# Patient Record
Sex: MALE | Race: BLACK OR AFRICAN AMERICAN | Employment: FULL TIME | URBAN - METROPOLITAN AREA
[De-identification: names, ages, dates, MRNs, and addresses within clinical notes are randomized per-mention and may not be internally consistent; named-entity substitution may affect disease eponyms.]

---

## 2019-08-05 ENCOUNTER — OFFICE VISIT (OUTPATIENT)
Dept: FAMILY MEDICINE CLINIC | Age: 49
End: 2019-08-05
Payer: COMMERCIAL

## 2019-08-05 VITALS
DIASTOLIC BLOOD PRESSURE: 72 MMHG | RESPIRATION RATE: 16 BRPM | SYSTOLIC BLOOD PRESSURE: 114 MMHG | TEMPERATURE: 97.6 F | WEIGHT: 296.2 LBS | HEART RATE: 66 BPM | HEIGHT: 72 IN | BODY MASS INDEX: 40.12 KG/M2

## 2019-08-05 DIAGNOSIS — Z00.00 ENCOUNTER FOR MEDICAL EXAMINATION TO ESTABLISH CARE: Primary | ICD-10-CM

## 2019-08-05 DIAGNOSIS — Z13.220 LIPID SCREENING: ICD-10-CM

## 2019-08-05 DIAGNOSIS — K64.8 INTERNAL HEMORRHOID: ICD-10-CM

## 2019-08-05 DIAGNOSIS — K57.90 DIVERTICULOSIS: ICD-10-CM

## 2019-08-05 DIAGNOSIS — Z13.29 THYROID DISORDER SCREEN: ICD-10-CM

## 2019-08-05 DIAGNOSIS — Z13.1 SCREENING FOR DIABETES MELLITUS (DM): ICD-10-CM

## 2019-08-05 DIAGNOSIS — K76.89 AUTOIMMUNE DISEASE OF LIVER: ICD-10-CM

## 2019-08-05 PROCEDURE — 99204 OFFICE O/P NEW MOD 45 MIN: CPT | Performed by: PHYSICIAN ASSISTANT

## 2019-08-05 PROCEDURE — G8427 DOCREV CUR MEDS BY ELIG CLIN: HCPCS | Performed by: PHYSICIAN ASSISTANT

## 2019-08-05 PROCEDURE — 3017F COLORECTAL CA SCREEN DOC REV: CPT | Performed by: PHYSICIAN ASSISTANT

## 2019-08-05 PROCEDURE — 1036F TOBACCO NON-USER: CPT | Performed by: PHYSICIAN ASSISTANT

## 2019-08-05 PROCEDURE — G8417 CALC BMI ABV UP PARAM F/U: HCPCS | Performed by: PHYSICIAN ASSISTANT

## 2019-08-05 ASSESSMENT — ENCOUNTER SYMPTOMS
CHEST TIGHTNESS: 0
COUGH: 0
SINUS PRESSURE: 0
NAUSEA: 0
BLOOD IN STOOL: 0
CONSTIPATION: 0
SHORTNESS OF BREATH: 0
PHOTOPHOBIA: 0
WHEEZING: 0
ABDOMINAL PAIN: 0
EYE REDNESS: 0
VOMITING: 0
ABDOMINAL DISTENTION: 0
DIARRHEA: 0
COLOR CHANGE: 0
BACK PAIN: 0
SORE THROAT: 0

## 2019-08-05 ASSESSMENT — PATIENT HEALTH QUESTIONNAIRE - PHQ9
2. FEELING DOWN, DEPRESSED OR HOPELESS: 0
1. LITTLE INTEREST OR PLEASURE IN DOING THINGS: 0
SUM OF ALL RESPONSES TO PHQ QUESTIONS 1-9: 0
SUM OF ALL RESPONSES TO PHQ QUESTIONS 1-9: 0
SUM OF ALL RESPONSES TO PHQ9 QUESTIONS 1 & 2: 0

## 2019-08-05 NOTE — PROGRESS NOTES
[address] PRIMARY CARE  [address] [facility] [address]  Dept: [phone]  Dept Fax: [phone]    New Patient Visit Note  Date of patient's visit: 8/5/2019  Patient's Name:  Ahsan Lock YOB: 1970            [facility]  ______________________________________________________________________    Reason for visit: Establish care/Preventative care  ______________________________________________________________________  Chief Compliant   Establish Care and Hemorrhoids      ______________________________________________________________________  History of Presenting Illness:  History was obtained from the patient. Ahsan Lock is a 46 y.o. is here to establish care. Has past medical history of autoimmune hepatitis and is currently being treated with Imuran by gastroenterology. Patient also with recent history of internal hemorrhoids diagnosed by colonoscopy. He states that he is still having pain and intermittent bleeding but is not using the suppositories as prescribed. Patient is scheduled to get outpatient blood work today by his gastroenterologist.  He denies any recent abdominal pain, fevers, chills, change in bowel habits or dark urine. Patient states that over the oral steroids at high dose he has gained approximately 70 pounds. He states that he is eating fairly well and exercising 3 times a week but is having difficulty losing weight. Patient is no longer on steroid treatment. Patient denies any other medical problems or concerns. Maintenance is reviewed. Patient's Shingrix is up-to-date. Blood work ordered.     ______________________________________________________________________  Past Medical/Surgical History:        Diagnosis Date    Alcoholism /alcohol abuse ([facility] 8/21/2014    Allergic rhinitis, cause unspecified 8/7/2014    Cirrhosis with alcoholism ([facility] 8/21/2014    End-stage liver disease ([facility] 8/21/2014   

## 2019-08-07 DIAGNOSIS — Z13.1 SCREENING FOR DIABETES MELLITUS (DM): ICD-10-CM

## 2019-08-07 DIAGNOSIS — Z13.220 LIPID SCREENING: ICD-10-CM

## 2019-08-07 DIAGNOSIS — Z13.29 THYROID DISORDER SCREEN: ICD-10-CM

## 2019-08-07 LAB
AVERAGE GLUCOSE: 108
CHOLESTEROL, FASTING: 123
HBA1C MFR BLD: 5.4 %
HDLC SERPL-MCNC: 50 MG/DL (ref 35–70)
LDL CHOLESTEROL CALCULATED: 59 MG/DL (ref 0–160)
TRIGLYCERIDE, FASTING: 68
TSH SERPL DL<=0.05 MIU/L-ACNC: 1.3 UIU/ML

## 2019-10-01 ENCOUNTER — OFFICE VISIT (OUTPATIENT)
Dept: FAMILY MEDICINE CLINIC | Age: 49
End: 2019-10-01
Payer: COMMERCIAL

## 2019-10-01 VITALS
BODY MASS INDEX: 40.34 KG/M2 | HEART RATE: 86 BPM | TEMPERATURE: 99.5 F | DIASTOLIC BLOOD PRESSURE: 66 MMHG | OXYGEN SATURATION: 97 % | SYSTOLIC BLOOD PRESSURE: 102 MMHG | WEIGHT: 297.8 LBS | RESPIRATION RATE: 16 BRPM | HEIGHT: 72 IN

## 2019-10-01 DIAGNOSIS — J40 BRONCHITIS: Primary | ICD-10-CM

## 2019-10-01 PROCEDURE — G8484 FLU IMMUNIZE NO ADMIN: HCPCS | Performed by: PHYSICIAN ASSISTANT

## 2019-10-01 PROCEDURE — 94640 AIRWAY INHALATION TREATMENT: CPT | Performed by: PHYSICIAN ASSISTANT

## 2019-10-01 PROCEDURE — 3017F COLORECTAL CA SCREEN DOC REV: CPT | Performed by: PHYSICIAN ASSISTANT

## 2019-10-01 PROCEDURE — 1036F TOBACCO NON-USER: CPT | Performed by: PHYSICIAN ASSISTANT

## 2019-10-01 PROCEDURE — G8417 CALC BMI ABV UP PARAM F/U: HCPCS | Performed by: PHYSICIAN ASSISTANT

## 2019-10-01 PROCEDURE — 99214 OFFICE O/P EST MOD 30 MIN: CPT | Performed by: PHYSICIAN ASSISTANT

## 2019-10-01 PROCEDURE — G8427 DOCREV CUR MEDS BY ELIG CLIN: HCPCS | Performed by: PHYSICIAN ASSISTANT

## 2019-10-01 RX ORDER — AZITHROMYCIN 250 MG/1
TABLET, FILM COATED ORAL
Qty: 6 TABLET | Refills: 0 | Status: SHIPPED | OUTPATIENT
Start: 2019-10-01 | End: 2019-10-11

## 2019-10-01 RX ORDER — ALBUTEROL SULFATE 2.5 MG/3ML
2.5 SOLUTION RESPIRATORY (INHALATION) ONCE
Status: COMPLETED | OUTPATIENT
Start: 2019-10-01 | End: 2019-10-01

## 2019-10-01 RX ORDER — CEFTRIAXONE 1 G/1
1 INJECTION, POWDER, FOR SOLUTION INTRAMUSCULAR; INTRAVENOUS ONCE
Status: COMPLETED | OUTPATIENT
Start: 2019-10-01 | End: 2019-10-01

## 2019-10-01 RX ORDER — PREDNISONE 10 MG/1
10 TABLET ORAL 2 TIMES DAILY
Qty: 10 TABLET | Refills: 0 | Status: SHIPPED | OUTPATIENT
Start: 2019-10-01 | End: 2019-10-06

## 2019-10-01 RX ORDER — METHYLPREDNISOLONE SODIUM SUCCINATE 125 MG/2ML
125 INJECTION, POWDER, LYOPHILIZED, FOR SOLUTION INTRAMUSCULAR; INTRAVENOUS ONCE
Status: COMPLETED | OUTPATIENT
Start: 2019-10-01 | End: 2019-10-01

## 2019-10-01 RX ADMIN — ALBUTEROL SULFATE 2.5 MG: 2.5 SOLUTION RESPIRATORY (INHALATION) at 17:27

## 2019-10-01 RX ADMIN — METHYLPREDNISOLONE SODIUM SUCCINATE 125 MG: 125 INJECTION, POWDER, LYOPHILIZED, FOR SOLUTION INTRAMUSCULAR; INTRAVENOUS at 17:30

## 2019-10-01 RX ADMIN — CEFTRIAXONE 1 G: 1 INJECTION, POWDER, FOR SOLUTION INTRAMUSCULAR; INTRAVENOUS at 17:29

## 2019-10-01 ASSESSMENT — ENCOUNTER SYMPTOMS
TROUBLE SWALLOWING: 0
COUGH: 1
CHEST TIGHTNESS: 0
EYE REDNESS: 0
EYE ITCHING: 0
FACIAL SWELLING: 0
NAUSEA: 0
WHEEZING: 1
SINUS PRESSURE: 1
SORE THROAT: 1
RHINORRHEA: 1
SINUS PAIN: 0
SHORTNESS OF BREATH: 1
EYE DISCHARGE: 0
VOMITING: 0
BACK PAIN: 0

## 2019-10-04 ENCOUNTER — OFFICE VISIT (OUTPATIENT)
Dept: FAMILY MEDICINE CLINIC | Age: 49
End: 2019-10-04
Payer: COMMERCIAL

## 2019-10-04 VITALS
SYSTOLIC BLOOD PRESSURE: 115 MMHG | DIASTOLIC BLOOD PRESSURE: 74 MMHG | HEIGHT: 72 IN | BODY MASS INDEX: 40.34 KG/M2 | RESPIRATION RATE: 16 BRPM | TEMPERATURE: 97 F | WEIGHT: 297.84 LBS | HEART RATE: 63 BPM

## 2019-10-04 DIAGNOSIS — J40 BRONCHITIS: Primary | ICD-10-CM

## 2019-10-04 DIAGNOSIS — J45.20 MILD INTERMITTENT ASTHMA WITHOUT COMPLICATION: ICD-10-CM

## 2019-10-04 PROCEDURE — 99213 OFFICE O/P EST LOW 20 MIN: CPT | Performed by: PHYSICIAN ASSISTANT

## 2019-10-04 PROCEDURE — 3017F COLORECTAL CA SCREEN DOC REV: CPT | Performed by: PHYSICIAN ASSISTANT

## 2019-10-04 PROCEDURE — G8417 CALC BMI ABV UP PARAM F/U: HCPCS | Performed by: PHYSICIAN ASSISTANT

## 2019-10-04 PROCEDURE — 1036F TOBACCO NON-USER: CPT | Performed by: PHYSICIAN ASSISTANT

## 2019-10-04 PROCEDURE — G8427 DOCREV CUR MEDS BY ELIG CLIN: HCPCS | Performed by: PHYSICIAN ASSISTANT

## 2019-10-04 PROCEDURE — G8484 FLU IMMUNIZE NO ADMIN: HCPCS | Performed by: PHYSICIAN ASSISTANT

## 2019-10-04 RX ORDER — MELOXICAM 15 MG/1
TABLET ORAL
Refills: 0 | COMMUNITY
Start: 2019-10-02 | End: 2021-11-03 | Stop reason: SDUPTHER

## 2019-10-04 RX ORDER — GUAIFENESIN AND CODEINE PHOSPHATE 100; 10 MG/5ML; MG/5ML
5 SOLUTION ORAL EVERY 4 HOURS PRN
Qty: 118 ML | Refills: 0 | Status: SHIPPED | OUTPATIENT
Start: 2019-10-04 | End: 2019-10-07

## 2019-10-04 RX ORDER — TRAMADOL HYDROCHLORIDE 50 MG/1
TABLET ORAL
Refills: 0 | COMMUNITY
Start: 2019-10-02 | End: 2020-02-03 | Stop reason: SDUPTHER

## 2019-10-04 RX ORDER — ALBUTEROL SULFATE 2.5 MG/3ML
2.5 SOLUTION RESPIRATORY (INHALATION) EVERY 4 HOURS PRN
Qty: 1 PACKAGE | Refills: 2 | Status: SHIPPED | OUTPATIENT
Start: 2019-10-04 | End: 2020-09-29 | Stop reason: SDUPTHER

## 2019-10-04 RX ORDER — ALBUTEROL SULFATE 90 UG/1
AEROSOL, METERED RESPIRATORY (INHALATION)
Qty: 18 G | Refills: 0 | Status: SHIPPED | OUTPATIENT
Start: 2019-10-04 | End: 2020-09-29 | Stop reason: SDUPTHER

## 2019-10-04 ASSESSMENT — ENCOUNTER SYMPTOMS
EYE REDNESS: 0
WHEEZING: 1
SHORTNESS OF BREATH: 1
NAUSEA: 0
EYE ITCHING: 0
VOMITING: 0
EYE DISCHARGE: 0
SINUS PRESSURE: 0
TROUBLE SWALLOWING: 0
BACK PAIN: 0
SORE THROAT: 1
CHEST TIGHTNESS: 0
SINUS PAIN: 0
COUGH: 1
FACIAL SWELLING: 0
RHINORRHEA: 0

## 2019-10-10 ENCOUNTER — OFFICE VISIT (OUTPATIENT)
Dept: FAMILY MEDICINE CLINIC | Age: 49
End: 2019-10-10
Payer: COMMERCIAL

## 2019-10-10 VITALS
RESPIRATION RATE: 16 BRPM | WEIGHT: 297.84 LBS | BODY MASS INDEX: 40.34 KG/M2 | SYSTOLIC BLOOD PRESSURE: 111 MMHG | TEMPERATURE: 97.8 F | HEART RATE: 67 BPM | HEIGHT: 72 IN | DIASTOLIC BLOOD PRESSURE: 68 MMHG

## 2019-10-10 DIAGNOSIS — E66.01 CLASS 3 SEVERE OBESITY DUE TO EXCESS CALORIES WITHOUT SERIOUS COMORBIDITY WITH BODY MASS INDEX (BMI) OF 40.0 TO 44.9 IN ADULT (HCC): Primary | ICD-10-CM

## 2019-10-10 PROCEDURE — G8427 DOCREV CUR MEDS BY ELIG CLIN: HCPCS | Performed by: PHYSICIAN ASSISTANT

## 2019-10-10 PROCEDURE — G8484 FLU IMMUNIZE NO ADMIN: HCPCS | Performed by: PHYSICIAN ASSISTANT

## 2019-10-10 PROCEDURE — 3017F COLORECTAL CA SCREEN DOC REV: CPT | Performed by: PHYSICIAN ASSISTANT

## 2019-10-10 PROCEDURE — 99213 OFFICE O/P EST LOW 20 MIN: CPT | Performed by: PHYSICIAN ASSISTANT

## 2019-10-10 PROCEDURE — G8417 CALC BMI ABV UP PARAM F/U: HCPCS | Performed by: PHYSICIAN ASSISTANT

## 2019-10-10 PROCEDURE — 1036F TOBACCO NON-USER: CPT | Performed by: PHYSICIAN ASSISTANT

## 2019-10-10 RX ORDER — PHENTERMINE HYDROCHLORIDE 37.5 MG/1
37.5 TABLET ORAL
Qty: 30 TABLET | Refills: 0 | Status: SHIPPED | OUTPATIENT
Start: 2019-10-10 | End: 2019-11-09

## 2019-10-10 ASSESSMENT — ENCOUNTER SYMPTOMS
NAUSEA: 0
COUGH: 1
BLOOD IN STOOL: 0
CONSTIPATION: 0
SORE THROAT: 0
SINUS PAIN: 0
VOMITING: 0
SHORTNESS OF BREATH: 0
WHEEZING: 0
BACK PAIN: 0
ABDOMINAL PAIN: 0
DIARRHEA: 0
CHEST TIGHTNESS: 0

## 2019-11-06 ENCOUNTER — OFFICE VISIT (OUTPATIENT)
Dept: FAMILY MEDICINE CLINIC | Age: 49
End: 2019-11-06
Payer: COMMERCIAL

## 2019-11-06 VITALS
SYSTOLIC BLOOD PRESSURE: 110 MMHG | BODY MASS INDEX: 40.2 KG/M2 | HEIGHT: 72 IN | TEMPERATURE: 97.8 F | DIASTOLIC BLOOD PRESSURE: 72 MMHG | WEIGHT: 296.8 LBS | HEART RATE: 65 BPM | RESPIRATION RATE: 16 BRPM

## 2019-11-06 DIAGNOSIS — Z23 NEED FOR INFLUENZA VACCINATION: Primary | ICD-10-CM

## 2019-11-06 DIAGNOSIS — E66.01 CLASS 3 SEVERE OBESITY DUE TO EXCESS CALORIES WITHOUT SERIOUS COMORBIDITY WITH BODY MASS INDEX (BMI) OF 40.0 TO 44.9 IN ADULT (HCC): ICD-10-CM

## 2019-11-06 DIAGNOSIS — Z12.5 PROSTATE CANCER SCREENING: ICD-10-CM

## 2019-11-06 DIAGNOSIS — E29.1 TESTOSTERONE DEFICIENCY IN MALE: ICD-10-CM

## 2019-11-06 PROCEDURE — 90686 IIV4 VACC NO PRSV 0.5 ML IM: CPT | Performed by: PHYSICIAN ASSISTANT

## 2019-11-06 PROCEDURE — G8482 FLU IMMUNIZE ORDER/ADMIN: HCPCS | Performed by: PHYSICIAN ASSISTANT

## 2019-11-06 PROCEDURE — G8417 CALC BMI ABV UP PARAM F/U: HCPCS | Performed by: PHYSICIAN ASSISTANT

## 2019-11-06 PROCEDURE — 3017F COLORECTAL CA SCREEN DOC REV: CPT | Performed by: PHYSICIAN ASSISTANT

## 2019-11-06 PROCEDURE — G8427 DOCREV CUR MEDS BY ELIG CLIN: HCPCS | Performed by: PHYSICIAN ASSISTANT

## 2019-11-06 PROCEDURE — 90471 IMMUNIZATION ADMIN: CPT | Performed by: PHYSICIAN ASSISTANT

## 2019-11-06 PROCEDURE — 99214 OFFICE O/P EST MOD 30 MIN: CPT | Performed by: PHYSICIAN ASSISTANT

## 2019-11-06 PROCEDURE — 1036F TOBACCO NON-USER: CPT | Performed by: PHYSICIAN ASSISTANT

## 2019-11-06 RX ORDER — RIFAXIMIN 550 MG/1
TABLET ORAL
Refills: 0 | COMMUNITY
Start: 2019-10-10 | End: 2022-07-19

## 2019-11-06 RX ORDER — PHENTERMINE HYDROCHLORIDE 37.5 MG/1
37.5 TABLET ORAL
Qty: 30 TABLET | Refills: 0 | Status: SHIPPED | OUTPATIENT
Start: 2019-11-06 | End: 2019-12-06

## 2019-11-06 ASSESSMENT — ENCOUNTER SYMPTOMS
SHORTNESS OF BREATH: 0
DIARRHEA: 0
CHEST TIGHTNESS: 0
COUGH: 0
WHEEZING: 0
BLOOD IN STOOL: 0
VOMITING: 0
NAUSEA: 0
ABDOMINAL PAIN: 0
CONSTIPATION: 0
BACK PAIN: 0

## 2019-11-08 LAB
BUN BLDV-MCNC: 12 MG/DL
CALCIUM SERPL-MCNC: 9.2 MG/DL
CHLORIDE BLD-SCNC: 107 MMOL/L
CO2: 26 MMOL/L
CREAT SERPL-MCNC: 0.83 MG/DL
GFR CALCULATED: >60
GLUCOSE BLD-MCNC: 78 MG/DL
POTASSIUM SERPL-SCNC: 3.9 MMOL/L
PROSTATE SPECIFIC ANTIGEN: 0.32 NG/ML
SODIUM BLD-SCNC: 139 MMOL/L
TESTOSTERONE FREE: 7.88
TESTOSTERONE TOTAL: 716

## 2019-11-13 DIAGNOSIS — E66.01 CLASS 3 SEVERE OBESITY DUE TO EXCESS CALORIES WITHOUT SERIOUS COMORBIDITY WITH BODY MASS INDEX (BMI) OF 40.0 TO 44.9 IN ADULT (HCC): ICD-10-CM

## 2019-11-13 DIAGNOSIS — Z12.5 PROSTATE CANCER SCREENING: ICD-10-CM

## 2019-12-09 ENCOUNTER — OFFICE VISIT (OUTPATIENT)
Dept: FAMILY MEDICINE CLINIC | Age: 49
End: 2019-12-09
Payer: COMMERCIAL

## 2019-12-09 VITALS
DIASTOLIC BLOOD PRESSURE: 67 MMHG | TEMPERATURE: 97 F | BODY MASS INDEX: 40.31 KG/M2 | WEIGHT: 297.6 LBS | HEIGHT: 72 IN | RESPIRATION RATE: 16 BRPM | HEART RATE: 74 BPM | SYSTOLIC BLOOD PRESSURE: 106 MMHG

## 2019-12-09 DIAGNOSIS — E66.01 CLASS 3 SEVERE OBESITY DUE TO EXCESS CALORIES WITHOUT SERIOUS COMORBIDITY WITH BODY MASS INDEX (BMI) OF 40.0 TO 44.9 IN ADULT (HCC): Primary | ICD-10-CM

## 2019-12-09 PROCEDURE — 3017F COLORECTAL CA SCREEN DOC REV: CPT | Performed by: PHYSICIAN ASSISTANT

## 2019-12-09 PROCEDURE — G8427 DOCREV CUR MEDS BY ELIG CLIN: HCPCS | Performed by: PHYSICIAN ASSISTANT

## 2019-12-09 PROCEDURE — 1036F TOBACCO NON-USER: CPT | Performed by: PHYSICIAN ASSISTANT

## 2019-12-09 PROCEDURE — 99213 OFFICE O/P EST LOW 20 MIN: CPT | Performed by: PHYSICIAN ASSISTANT

## 2019-12-09 PROCEDURE — G8417 CALC BMI ABV UP PARAM F/U: HCPCS | Performed by: PHYSICIAN ASSISTANT

## 2019-12-09 PROCEDURE — G8482 FLU IMMUNIZE ORDER/ADMIN: HCPCS | Performed by: PHYSICIAN ASSISTANT

## 2019-12-09 RX ORDER — PHENTERMINE HYDROCHLORIDE 37.5 MG/1
37.5 TABLET ORAL
Qty: 30 TABLET | Refills: 0 | Status: SHIPPED | OUTPATIENT
Start: 2019-12-09 | End: 2020-01-08

## 2019-12-10 ASSESSMENT — ENCOUNTER SYMPTOMS
SHORTNESS OF BREATH: 0
WHEEZING: 0
ABDOMINAL PAIN: 0
DIARRHEA: 0
BACK PAIN: 0
CHEST TIGHTNESS: 0
BLOOD IN STOOL: 0
NAUSEA: 0
COUGH: 0
VOMITING: 0
CONSTIPATION: 0

## 2019-12-31 ENCOUNTER — OFFICE VISIT (OUTPATIENT)
Dept: FAMILY MEDICINE CLINIC | Age: 49
End: 2019-12-31
Payer: COMMERCIAL

## 2019-12-31 VITALS
SYSTOLIC BLOOD PRESSURE: 108 MMHG | HEART RATE: 65 BPM | RESPIRATION RATE: 16 BRPM | HEIGHT: 72 IN | BODY MASS INDEX: 40.74 KG/M2 | DIASTOLIC BLOOD PRESSURE: 73 MMHG | TEMPERATURE: 97.5 F | WEIGHT: 300.8 LBS

## 2019-12-31 DIAGNOSIS — Z00.00 ANNUAL PHYSICAL EXAM: ICD-10-CM

## 2019-12-31 DIAGNOSIS — Z23 NEED FOR PROPHYLACTIC VACCINATION AND INOCULATION AGAINST VARICELLA: Primary | ICD-10-CM

## 2019-12-31 PROCEDURE — 99396 PREV VISIT EST AGE 40-64: CPT | Performed by: PHYSICIAN ASSISTANT

## 2019-12-31 PROCEDURE — G8482 FLU IMMUNIZE ORDER/ADMIN: HCPCS | Performed by: PHYSICIAN ASSISTANT

## 2020-02-03 ENCOUNTER — OFFICE VISIT (OUTPATIENT)
Dept: FAMILY MEDICINE CLINIC | Age: 50
End: 2020-02-03
Payer: COMMERCIAL

## 2020-02-03 VITALS
HEART RATE: 70 BPM | BODY MASS INDEX: 39.42 KG/M2 | DIASTOLIC BLOOD PRESSURE: 75 MMHG | WEIGHT: 291 LBS | HEIGHT: 72 IN | SYSTOLIC BLOOD PRESSURE: 109 MMHG | TEMPERATURE: 98 F | OXYGEN SATURATION: 100 %

## 2020-02-03 PROCEDURE — 1036F TOBACCO NON-USER: CPT | Performed by: PHYSICIAN ASSISTANT

## 2020-02-03 PROCEDURE — 99214 OFFICE O/P EST MOD 30 MIN: CPT | Performed by: PHYSICIAN ASSISTANT

## 2020-02-03 PROCEDURE — 3017F COLORECTAL CA SCREEN DOC REV: CPT | Performed by: PHYSICIAN ASSISTANT

## 2020-02-03 PROCEDURE — G8482 FLU IMMUNIZE ORDER/ADMIN: HCPCS | Performed by: PHYSICIAN ASSISTANT

## 2020-02-03 PROCEDURE — G8427 DOCREV CUR MEDS BY ELIG CLIN: HCPCS | Performed by: PHYSICIAN ASSISTANT

## 2020-02-03 PROCEDURE — G8417 CALC BMI ABV UP PARAM F/U: HCPCS | Performed by: PHYSICIAN ASSISTANT

## 2020-02-03 RX ORDER — TRAMADOL HYDROCHLORIDE 50 MG/1
50 TABLET ORAL EVERY 8 HOURS PRN
Qty: 20 TABLET | Refills: 0 | Status: SHIPPED | OUTPATIENT
Start: 2020-02-03 | End: 2020-03-04

## 2020-02-03 RX ORDER — TRAMADOL HYDROCHLORIDE 50 MG/1
50 TABLET ORAL EVERY 6 HOURS PRN
Qty: 28 TABLET | Refills: 0 | Status: CANCELLED | OUTPATIENT
Start: 2020-02-03 | End: 2020-02-17

## 2020-02-03 ASSESSMENT — ENCOUNTER SYMPTOMS
CONSTIPATION: 0
ABDOMINAL PAIN: 0
WHEEZING: 0
BACK PAIN: 0
COUGH: 0
NAUSEA: 0
VOMITING: 0
CHEST TIGHTNESS: 0
BLOOD IN STOOL: 0
SHORTNESS OF BREATH: 0
DIARRHEA: 0

## 2020-02-03 ASSESSMENT — PATIENT HEALTH QUESTIONNAIRE - PHQ9
SUM OF ALL RESPONSES TO PHQ QUESTIONS 1-9: 0
1. LITTLE INTEREST OR PLEASURE IN DOING THINGS: 0
SUM OF ALL RESPONSES TO PHQ QUESTIONS 1-9: 0
2. FEELING DOWN, DEPRESSED OR HOPELESS: 0
SUM OF ALL RESPONSES TO PHQ9 QUESTIONS 1 & 2: 0

## 2020-02-03 NOTE — PROGRESS NOTES
[address] PRIMARY CARE  [address] [place] [address] [place] [address]  Dept: [phone]  Dept Fax: [phone]    Office Progress Note  Date of patient's visit: 2/3/2020  Patient's Name:  Maxine Early YOB: 1970            [facility] MARÍA ELENA WEEKS PA  ================================================================    REASON FOR VISIT/CHIEF COMPLAINT:  Knee Pain and Referral - General    HISTORY OF PRESENTING ILLNESS:  History was obtained from: patient. Maxine Early is a 44 y.o. male who presents with c/o knee pain. Patient has been seen by orthopedics and recommended to have knee replacement surgery. Patient states that it will be in the spring with other knee replacement next fall. Patient states that he is here today because he is out of his tramadol that he has been taking for the knee pain. Oars report reviewed. Patient will be given refill of tramadol. Patient has preoperative testing for EGD on 2/13/2020 due to chronic history of liver disease. Patient also needs clearance for knee replacement surgery. Patient has chronic history of thrombocytopenia and autoimmune disorder of the liver. Patient was seeing hematology previously but has not seen them in a while. New referral is placed. Patient has had previous echocardiogram a couple of years ago but will get cardiac stress test prior to clearing for surgery. Patient denies any current chest pain, shortness of breath, headaches, dizziness or unusual bleeding.       Patient Active Problem List   Diagnosis    Allergic rhinitis    Esophageal reflux    Insomnia    End-stage liver disease ([address]    Portal hypertension with esophageal varices (HCC)    Cirrhosis with alcoholism ([address]    Autoimmune hepatitis ([address]    Hepatic cirrhosis ([address]    Esophageal varices (HCC)    Acquired clotting factor deficiency ([address]    Diverticulosis    Internal hemorrhoid       There are no preventive care reminders to display for this patient. Allergies   Allergen Reactions    Acetaminophen      Patient has an autoimmune disease and was told that he should not take this medication          Current Outpatient Medications   Medication Sig Dispense Refill    traMADol (ULTRAM) 50 MG tablet Take 1 tablet by mouth every 8 hours as needed for Pain for up to 14 days. 20 tablet 0    XIFAXAN 550 MG tablet   0    meloxicam (MOBIC) 15 MG tablet   0    tretinoin (RETIN-A) 0.025 % cream   0    albuterol (PROVENTIL) (2.5 MG/3ML) 0.083% nebulizer solution Take 3 mLs by nebulization every 4 hours as needed for Wheezing or Shortness of Breath 1 Package 2    albuterol sulfate HFA (VENTOLIN HFA) 108 (90 Base) MCG/ACT inhaler inhale 2 puffs by mouth every 6 hours if needed for wheezing 18 g 0    fluticasone (FLONASE) 50 MCG/ACT nasal spray 1 spray by Nasal route daily 1 Bottle 3    benzoyl peroxide 10 % gel       diclofenac sodium 1 % GEL apply 4 grams to affected area four times a day 5 Tube 5    furosemide (LASIX) 40 MG tablet   0    propranolol (INDERAL) 10 MG tablet   0    Multiple Vitamins-Minerals (MULTIVITAMIN ADULT PO) Take 1 tablet by mouth      azaTHIOprine (IMURAN) 50 MG tablet Take 50 mg by mouth 3 times daily   0     No current facility-administered medications for this visit. Social History     Tobacco Use    Smoking status: Never Smoker    Smokeless tobacco: Never Used   Substance Use Topics    Alcohol use: No    Drug use: Not on file       Family History   Problem Relation Age of Onset    Hypertension Mother         Review of Systems   Constitutional: Negative for appetite change, chills, diaphoresis, fatigue, fever and unexpected weight change. Respiratory: Negative for cough, chest tightness, shortness of breath and wheezing. Cardiovascular: Negative for chest pain, palpitations and leg swelling.    Gastrointestinal: Negative for abdominal pain, blood in stool, constipation, diarrhea, nausea and SPECT REST EXERCISE OR RX   3. Class 3 severe obesity due to excess calories without serious comorbidity with body mass index (BMI) of 40.0 to 44.9 in adult (HCC)  NM MYOCARDIAL SPECT REST EXERCISE OR RX   4. Autoimmune disease of liver  AFL - Denise Myers MD, Hematology/Oncology, [place]   5. Thrombocytopenia ([facility]  Lloyd Mcintosh MD, Hematology/Oncology, [place]   6. Primary osteoarthritis involving multiple joints  traMADol (ULTRAM) 50 MG tablet       FOLLOW UP AND INSTRUCTIONS:  Return in about 3 months (around 5/3/2020), or if symptoms worsen or fail to improve. · Discussed use, benefit, and side effects of prescribed medications. Barriers to medication compliance addressed. All patient questions answered. Pt voiced understanding. · Patient instructed to return to the office if symptoms do not resolve or go directly to the ER if the symptoms worsen - patient voiced understanding. · Patient given educational materials - see patient instructions    R[address]ane [address] [facility]  2/3/2020, 12:46 PM    This note is created with the assistance of a speech-recognition program. While intending to generate a document that actually reflects the content of the visit, the document can still have some mistakes which may not have been identified and corrected by editing.

## 2020-03-07 RX ORDER — TRAMADOL HYDROCHLORIDE 50 MG/1
TABLET ORAL
Qty: 20 TABLET | Refills: 1 | Status: SHIPPED | OUTPATIENT
Start: 2020-03-07 | End: 2020-04-20 | Stop reason: SDUPTHER

## 2020-04-20 RX ORDER — TRAMADOL HYDROCHLORIDE 50 MG/1
50 TABLET ORAL DAILY PRN
Qty: 20 TABLET | Refills: 0 | Status: SHIPPED | OUTPATIENT
Start: 2020-04-20 | End: 2020-05-05 | Stop reason: SDUPTHER

## 2020-04-20 NOTE — TELEPHONE ENCOUNTER
Pt called back with the name of medication tramdol 50mg needs to be refilled and sent to pharmacy on file Never smoker

## 2020-05-01 ENCOUNTER — E-VISIT (OUTPATIENT)
Dept: PRIMARY CARE CLINIC | Age: 50
End: 2020-05-01
Payer: COMMERCIAL

## 2020-05-01 PROCEDURE — 98970 NQHP OL DIG ASSMT&MGMT 5-10: CPT | Performed by: NURSE PRACTITIONER

## 2020-05-04 PROBLEM — Z01.818 PRE-OP EVALUATION: Status: ACTIVE | Noted: 2020-05-04

## 2020-05-06 ENCOUNTER — TELEPHONE (OUTPATIENT)
Dept: FAMILY MEDICINE CLINIC | Age: 50
End: 2020-05-06

## 2020-05-06 ENCOUNTER — TELEPHONE (OUTPATIENT)
Dept: ADMINISTRATIVE | Age: 50
End: 2020-05-06

## 2020-06-03 PROBLEM — Z01.818 PRE-OP EVALUATION: Status: RESOLVED | Noted: 2020-05-04 | Resolved: 2020-06-03

## 2020-07-07 NOTE — TELEPHONE ENCOUNTER
Please inquire with pt when he is scheduled for knee surgery before I can refill his pain medication

## 2020-07-07 NOTE — TELEPHONE ENCOUNTER
Last visit: 5/1/2020  Last Med refill:     Next Visit Date:  No future appointments.     Health Maintenance   Topic Date Due    HIV screen  12/31/2020 (Originally 11/30/1985)    Shingles Vaccine (2 of 2) 02/03/2021 (Originally 11/30/2018)    Flu vaccine (1) 09/01/2020    Potassium monitoring  11/08/2020    Creatinine monitoring  11/08/2020    Lipid screen  08/05/2024    DTaP/Tdap/Td vaccine (2 - Td) 10/05/2027    Colon cancer screen colonoscopy  04/10/2029    Hepatitis A vaccine  Completed    Hepatitis B vaccine  Completed    Pneumococcal 0-64 years Vaccine  Completed    Hib vaccine  Aged Out    Meningococcal (ACWY) vaccine  Aged Out       Hemoglobin A1C (%)   Date Value   08/05/2019 5.4             ( goal A1C is < 7)   No results found for: LABMICR  LDL Calculated (mg/dL)   Date Value   08/05/2019 59   05/24/2017 59       (goal LDL is <100)   AST (U/L)   Date Value   02/26/2016 78 (H)     ALT (U/L)   Date Value   02/26/2016 45 (H)     BUN (mg/dL)   Date Value   11/08/2019 12     BP Readings from Last 3 Encounters:   02/03/20 109/75   12/31/19 108/73   12/09/19 106/67          (goal 120/80)    All Future Testing planned in [facility]  Lab Frequency Next Occurrence   Testosterone, Free Once 05/05/2020   NM MYOCARDIAL SPECT REST EXERCISE OR RX Once 02/03/2020   XR CHEST STANDARD (2 VW) Once 05/11/2020               Patient Active Problem List:     Allergic rhinitis     Esophageal reflux     Insomnia     End-stage liver disease (HCC)     Portal hypertension with esophageal varices (HCC)     Cirrhosis with alcoholism (HCC)     Autoimmune hepatitis ([address]     Hepatic cirrhosis (HCC)     Esophageal varices ([address]     Acquired clotting factor deficiency ([address]     Diverticulosis     Internal hemorrhoid

## 2020-07-10 RX ORDER — TRAMADOL HYDROCHLORIDE 50 MG/1
TABLET ORAL
Qty: 30 TABLET | OUTPATIENT
Start: 2020-07-10

## 2020-07-21 ENCOUNTER — TELEPHONE (OUTPATIENT)
Dept: FAMILY MEDICINE CLINIC | Age: 50
End: 2020-07-21

## 2020-08-11 ENCOUNTER — TELEPHONE (OUTPATIENT)
Dept: FAMILY MEDICINE CLINIC | Age: 50
End: 2020-08-11

## 2020-09-29 ENCOUNTER — OFFICE VISIT (OUTPATIENT)
Dept: FAMILY MEDICINE CLINIC | Age: 50
End: 2020-09-29
Payer: COMMERCIAL

## 2020-09-29 VITALS
BODY MASS INDEX: 40.9 KG/M2 | HEART RATE: 67 BPM | TEMPERATURE: 97.3 F | DIASTOLIC BLOOD PRESSURE: 73 MMHG | RESPIRATION RATE: 16 BRPM | WEIGHT: 302 LBS | SYSTOLIC BLOOD PRESSURE: 110 MMHG | HEIGHT: 72 IN

## 2020-09-29 PROCEDURE — 96372 THER/PROPH/DIAG INJ SC/IM: CPT | Performed by: PHYSICIAN ASSISTANT

## 2020-09-29 PROCEDURE — 90471 IMMUNIZATION ADMIN: CPT | Performed by: PHYSICIAN ASSISTANT

## 2020-09-29 PROCEDURE — 90686 IIV4 VACC NO PRSV 0.5 ML IM: CPT | Performed by: PHYSICIAN ASSISTANT

## 2020-09-29 PROCEDURE — 99213 OFFICE O/P EST LOW 20 MIN: CPT | Performed by: PHYSICIAN ASSISTANT

## 2020-09-29 RX ORDER — FLUTICASONE PROPIONATE 50 MCG
1 SPRAY, SUSPENSION (ML) NASAL DAILY
Qty: 1 BOTTLE | Refills: 3 | Status: SHIPPED | OUTPATIENT
Start: 2020-09-29 | End: 2021-05-19 | Stop reason: SDUPTHER

## 2020-09-29 RX ORDER — METHYLPREDNISOLONE SODIUM SUCCINATE 125 MG/2ML
125 INJECTION, POWDER, LYOPHILIZED, FOR SOLUTION INTRAMUSCULAR; INTRAVENOUS ONCE
Status: COMPLETED | OUTPATIENT
Start: 2020-09-29 | End: 2020-09-29

## 2020-09-29 RX ORDER — ALBUTEROL SULFATE 90 UG/1
AEROSOL, METERED RESPIRATORY (INHALATION)
Qty: 18 G | Refills: 0 | Status: SHIPPED | OUTPATIENT
Start: 2020-09-29 | End: 2020-10-19

## 2020-09-29 RX ORDER — ALBUTEROL SULFATE 2.5 MG/3ML
2.5 SOLUTION RESPIRATORY (INHALATION) EVERY 4 HOURS PRN
Qty: 1 PACKAGE | Refills: 2 | Status: SHIPPED | OUTPATIENT
Start: 2020-09-29 | End: 2021-08-16 | Stop reason: SDUPTHER

## 2020-09-29 RX ADMIN — METHYLPREDNISOLONE SODIUM SUCCINATE 125 MG: 125 INJECTION, POWDER, LYOPHILIZED, FOR SOLUTION INTRAMUSCULAR; INTRAVENOUS at 11:04

## 2020-09-29 ASSESSMENT — ENCOUNTER SYMPTOMS
EYE DISCHARGE: 0
EYE ITCHING: 0
RECTAL PAIN: 0
ANAL BLEEDING: 0
ABDOMINAL DISTENTION: 0
COUGH: 1
BACK PAIN: 0
SHORTNESS OF BREATH: 0
VOMITING: 0
CHEST TIGHTNESS: 0
DIARRHEA: 0
SINUS PAIN: 0
TROUBLE SWALLOWING: 0
EYE REDNESS: 0
SORE THROAT: 0
VOICE CHANGE: 0
RHINORRHEA: 1
FACIAL SWELLING: 0
WHEEZING: 1
NAUSEA: 0
BLOOD IN STOOL: 0
SINUS PRESSURE: 0
CONSTIPATION: 0
ABDOMINAL PAIN: 0

## 2020-09-29 NOTE — PROGRESS NOTES
[address] PRIMARY CARE  [address] [place] [address]  Dept: [phone]  Dept Fax: [phone]    Office Progress Note  Date of patient's visit: 2020  Patient's Name:  Scarlet Harvey YOB: 1970            JOSUÉ MELENDEZ  ================================================================    REASON FOR VISIT/CHIEF COMPLAINT:  Allergies    HISTORY OF PRESENTING ILLNESS:  History was obtained from: patient. Scarlet Harvey is a 44 y.o. male who presents with c/o clear rhinorrhea, cough productive of clear sputum with mild wheezing and intermittent chest tightness with breathing for the last week and a half. Patient has history of seasonal allergies and asthma. He states that his albuterol inhaler and Flonase are . He denies any chest pain, shortness of breath, difficulty breathing, nausea or vomiting. Patient Active Problem List   Diagnosis    Allergic rhinitis    Esophageal reflux    Insomnia    [facility]-stage [facility] disease ([facility]    Portal hypertension with esophageal varices (HCC)    Cirrhosis with alcoholism ([facility]    Autoimmune hepatitis ([facility]    Hepatic cirrhosis ([facility]    Esophageal varices (HCC)    Acquired clotting factor deficiency ([facility]    Diverticulosis    Internal hemorrhoid       There are no preventive care reminders to display for this patient.     Allergies   Allergen Reactions    Acetaminophen      Patient has an autoimmune disease and was told that he should not take this medication          Current Outpatient Medications   Medication Sig Dispense Refill    fluticasone (FLONASE) 50 MCG/ACT nasal spray 1 spray by Nasal route daily 1 Bottle 3    albuterol sulfate HFA (VENTOLIN HFA) 108 (90 Base) MCG/ACT inhaler inhale 2 puffs by mouth every 6 hours if needed for wheezing 18 g 0    albuterol (PROVENTIL) (2.5 MG/3ML) 0.083% nebulizer solution Take 3 mLs by nebulization every 4 hours as needed for Wheezing or Shortness of Breath 1 Package 2    XIFAXAN 550 MG tablet   0    meloxicam (MOBIC) 15 MG tablet   0    benzoyl peroxide 10 % gel       propranolol (INDERAL) 10 MG tablet   0    Multiple Vitamins-Minerals (MULTIVITAMIN ADULT PO) Take 1 tablet by mouth      azaTHIOprine (IMURAN) 50 MG tablet Take 50 mg by mouth 3 times daily   0     No current facility-administered medications for this visit. Social History     Tobacco Use    Smoking status: Never Smoker    Smokeless tobacco: Never Used   Substance Use Topics    Alcohol use: No    Drug use: Not on file       Family History   Problem Relation Age of Onset    Hypertension Mother         Review of Systems   Constitutional: Negative for appetite change, chills, diaphoresis, fatigue, fever and unexpected weight change. HENT: Positive for congestion, postnasal drip and rhinorrhea. Negative for ear discharge, ear pain, facial swelling, hearing loss, nosebleeds, sinus pressure, sinus pain, sneezing, sore throat, tinnitus, trouble swallowing and voice change. Eyes: Negative for discharge, redness and itching. Respiratory: Positive for cough and wheezing. Negative for chest tightness and shortness of breath. Cardiovascular: Negative for chest pain, palpitations and leg swelling. Gastrointestinal: Negative for abdominal distention, abdominal pain, anal bleeding, blood in stool, constipation, diarrhea, nausea, rectal pain and vomiting. Musculoskeletal: Negative for back pain, neck pain and neck stiffness. Neurological: Negative for dizziness, syncope, weakness, light-headedness and headaches. Physical Exam  Vitals signs and nursing note reviewed. Constitutional:       General: He is not in acute distress. Appearance: Normal appearance. He is well-developed. He is not ill-appearing, toxic-appearing or diaphoretic. HENT:      Head: Normocephalic and atraumatic.       Right Ear: Hearing, tympanic membrane, ear canal and external ear normal.      Left Ear: Hearing, tympanic membrane, ear canal and external ear normal.      Nose:      Right Sinus: No maxillary sinus tenderness or frontal sinus tenderness. Left Sinus: No maxillary sinus tenderness or frontal sinus tenderness. Mouth/Throat:      Lips: Pink. Mouth: Mucous membranes are moist.      Pharynx: Oropharynx is clear. Uvula midline. No pharyngeal swelling, oropharyngeal exudate, posterior oropharyngeal erythema or uvula swelling. Tonsils: No tonsillar exudate or tonsillar abscesses. Eyes:      General: No scleral icterus. Right eye: No discharge. Left eye: No discharge. Conjunctiva/sclera: Conjunctivae normal.   Neck:      Musculoskeletal: Normal range of motion and neck supple. Thyroid: No thyromegaly. Cardiovascular:      Rate and Rhythm: Normal rate and regular rhythm. Heart sounds: Normal heart sounds. No murmur. No friction rub. No gallop. Pulmonary:      Effort: Pulmonary effort is normal. No respiratory distress. Breath sounds: Examination of the right-lower field reveals wheezing. Examination of the left-lower field reveals wheezing. Wheezing present. No decreased breath sounds, rhonchi or rales. Comments: Mild end expiratory wheezing, no retractions, venting at ease and speaking in full sentences  Abdominal:      Palpations: Abdomen is soft. Tenderness: There is no abdominal tenderness. Musculoskeletal: Normal range of motion. Lymphadenopathy:      Cervical: No cervical adenopathy. Skin:     General: Skin is warm and dry. Neurological:      General: No focal deficit present. Mental Status: He is alert and oriented to person, place, and time. Psychiatric:         Attention and Perception: Attention and perception normal.         Mood and Affect: Mood normal.         Speech: Speech normal.         Behavior: Behavior normal. Behavior is cooperative. Thought Content:  Thought content normal. Judgment: Judgment normal.         Vitals:    09/29/20 1041   BP: 110/73   Site: Left Upper Arm   Position: Sitting   Cuff Size: Large Adult   Pulse: 67   Resp: 16   Temp: 97.3 °F (36.3 °C)   TempSrc: Temporal   Weight: (!) 302 lb (137 kg)   Height: 6' 0.01\" (1.829 m)     BP Readings from Last 3 Encounters:   09/29/20 110/73   02/03/20 109/75   12/31/19 108/73              DIAGNOSTIC FINDINGS:  CBC:  Lab Results   Component Value Date    WBC 3.6 02/26/2016    HGB 13.4 02/26/2016    PLT 76 02/26/2016       BMP:    Lab Results   Component Value Date     11/08/2019    K 3.9 11/08/2019     11/08/2019    CO2 26 11/08/2019    BUN 12 11/08/2019    CREATININE 0.83 11/08/2019    GLUCOSE 78 11/08/2019         FASTING LIPID PANEL:  Lab Results   Component Value Date    CHOL 120 05/24/2017    HDL 50 08/05/2019    TRIG 46 05/24/2017       No results found for this visit on 09/29/20. ASSESSMENT AND PLAN:   Diagnosis Orders   1. Mild intermittent asthma with exacerbation  methylPREDNISolone sodium (SOLU-MEDROL) injection 125 mg   2. Need for influenza vaccination  INFLUENZA, QUADV, 3 YRS AND OLDER, IM PF, PREFILL SYR OR SDV, 0.5ML (AFLURIA QUADV, PF)   3. Seasonal allergic rhinitis due to pollen  fluticasone (FLONASE) 50 MCG/ACT nasal spray   4. Mild intermittent asthma without complication  albuterol sulfate HFA (VENTOLIN HFA) 108 (90 Base) MCG/ACT inhaler    albuterol (PROVENTIL) (2.5 MG/3ML) 0.083% nebulizer solution       FOLLOW UP AND INSTRUCTIONS:  Return if symptoms worsen or fail to improve. · Discussed use, benefit, and side effects of prescribed medications. Barriers to medication compliance addressed. All patient questions answered. Pt voiced understanding. · Patient instructed to return to the office if symptoms do not resolve or go directly to the ER if the symptoms worsen - patient voiced understanding.     · Patient given educational materials - see patient instructions    JOSUÉ GALINDO [address]  9/29/2020, 11:19 AM    This note is created with the assistance of a speech-recognition program. While intending to generate a document that actually reflects the content of the visit, the document can still have some mistakes which may not have been identified and corrected by editing.

## 2020-09-29 NOTE — PROGRESS NOTES
Vaccine Information Sheet, \"Influenza - Inactivated\"  given to Bruno Alarcon, or parent/legal guardian of  Bruno Alarcon and verbalized understanding. Patient responses:    Have you ever had a reaction to a flu vaccine? No  Do you have any current illness? No  Have you ever had Guillian [place] Syndrome? No  Do you have a serious allergy to any of the following: Neomycin, Polymyxin, Thimerosal, eggs or egg products? No    Flu vaccine given per order. Please see immunization tab. Risks and benefits explained. Current VIS given.

## 2020-10-08 ENCOUNTER — OFFICE VISIT (OUTPATIENT)
Dept: FAMILY MEDICINE CLINIC | Age: 50
End: 2020-10-08
Payer: COMMERCIAL

## 2020-10-08 ENCOUNTER — NURSE TRIAGE (OUTPATIENT)
Dept: OTHER | Facility: CLINIC | Age: 50
End: 2020-10-08

## 2020-10-08 VITALS
WEIGHT: 302 LBS | RESPIRATION RATE: 16 BRPM | HEART RATE: 82 BPM | SYSTOLIC BLOOD PRESSURE: 98 MMHG | BODY MASS INDEX: 40.95 KG/M2 | DIASTOLIC BLOOD PRESSURE: 70 MMHG | OXYGEN SATURATION: 98 %

## 2020-10-08 PROCEDURE — 99213 OFFICE O/P EST LOW 20 MIN: CPT | Performed by: FAMILY MEDICINE

## 2020-10-08 RX ORDER — CYCLOBENZAPRINE HCL 5 MG
5 TABLET ORAL 2 TIMES DAILY PRN
Qty: 10 TABLET | Refills: 0 | Status: SHIPPED | OUTPATIENT
Start: 2020-10-08 | End: 2020-10-18

## 2020-10-08 NOTE — PROGRESS NOTES
[address] PRIMARY CARE  [address] [place] [address]  Dept: [phone]  Dept Fax: [phone]    Kayleen Byrne is a 44 y.o. male who presents today for his medical conditions/complaints as noted below. Kayleen Byrne is c/o of   Chief Complaint   Patient presents with    Back Pain     raiting pain at 7 onset friday for both    Shoulder Pain     right rating pain at 9         HPI:     The patient presents for back pain. The patient states that his back in the low back is spasming and he has some shoulder spasms on the right as well. The pain is worse with movement. Nothing makes it better. The patient does note he was on a long drive and notes the pain seemed to start after the long drive. He notes the pain does go up into his neck on the right side. He denies any blurry vision, slurred speech, arm weakness. Patient does note that he feels like the area in the neck that is painful is swollen.         Hemoglobin A1C (%)   Date Value   08/05/2019 5.4             ( goal A1Cis < 7)   No results found for: LABMICR  LDL Calculated (mg/dL)   Date Value   08/05/2019 59   05/24/2017 59       (goal LDL is <100)   AST (U/L)   Date Value   02/26/2016 78 (H)     ALT (U/L)   Date Value   02/26/2016 45 (H)     BUN (mg/dL)   Date Value   11/08/2019 12     BP Readings from Last 3 Encounters:   10/08/20 98/70   09/29/20 110/73   02/03/20 109/75          (goal 120/80)    Past Medical History:   Diagnosis Date    Alcoholism /alcohol abuse ([address] 8/21/2014    Allergic rhinitis, cause unspecified 8/7/2014    Cirrhosis with alcoholism ([address] 8/21/2014    End-stage liver disease ([address] 8/21/2014    Esophageal reflux 8/7/2014    Essential hypertension, benign 8/7/2014    Portal hypertension with esophageal varices ([address] 8/21/2014      Past Surgical History:   Procedure Laterality Date    CHALAZION EXCISION Right 08/2017    UPPER GASTROINTESTINAL ENDOSCOPY  12/13/2016 Family History   Problem Relation Age of Onset    Hypertension Mother        Social History     Tobacco Use    Smoking status: Never Smoker    Smokeless tobacco: Never Used   Substance Use Topics    Alcohol use: No      Current Outpatient Medications   Medication Sig Dispense Refill    cyclobenzaprine (FLEXERIL) 5 MG tablet Take 1 tablet by mouth 2 times daily as needed for Muscle spasms 10 tablet 0    fluticasone (FLONASE) 50 MCG/ACT nasal spray 1 spray by Nasal route daily 1 Bottle 3    albuterol sulfate HFA (VENTOLIN HFA) 108 (90 Base) MCG/ACT inhaler inhale 2 puffs by mouth every 6 hours if needed for wheezing 18 g 0    albuterol (PROVENTIL) (2.5 MG/3ML) 0.083% nebulizer solution Take 3 mLs by nebulization every 4 hours as needed for Wheezing or Shortness of Breath 1 Package 2    XIFAXAN 550 MG tablet   0    meloxicam (MOBIC) 15 MG tablet   0    benzoyl peroxide 10 % gel       propranolol (INDERAL) 10 MG tablet   0    Multiple Vitamins-Minerals (MULTIVITAMIN ADULT PO) Take 1 tablet by mouth      azaTHIOprine (IMURAN) 50 MG tablet Take 50 mg by mouth 3 times daily   0     No current facility-administered medications for this visit. Allergies   Allergen Reactions    Acetaminophen      Patient has an autoimmune disease and was told that he should not take this medication        Health Maintenance   Topic Date Due    HIV screen  12/31/2020 (Originally 11/30/1985)    Shingles Vaccine (2 of 2) 02/03/2021 (Originally 11/30/2018)    Lipid screen  08/05/2024    DTaP/Tdap/Td vaccine (2 - Td) 10/05/2027    Colon cancer screen colonoscopy  04/10/2029    Hepatitis A vaccine  Completed    Hepatitis B vaccine  Completed    Flu vaccine  Completed    Pneumococcal 0-64 years Vaccine  Completed    Hib vaccine  Aged Out    Meningococcal (ACWY) vaccine  Aged Out       Subjective:     Review of Systems   Constitutional: Negative. Eyes: Negative. Respiratory: Negative.     Cardiovascular: Negative. Gastrointestinal: Negative. Musculoskeletal: Positive for neck pain and neck stiffness. Negative for arthralgias, back pain, gait problem, joint swelling and myalgias. Neurological: Positive for headaches. Psychiatric/Behavioral: Negative. Objective:     Physical Exam  Vitals signs and nursing note reviewed. Constitutional:       General: He is not in acute distress. Appearance: Normal appearance. He is obese. He is ill-appearing. He is not toxic-appearing or diaphoretic. HENT:      Head: Normocephalic and atraumatic. Right Ear: External ear normal.      Left Ear: External ear normal.   Eyes:      Extraocular Movements: Extraocular movements intact. Conjunctiva/sclera: Conjunctivae normal.   Neck:      Musculoskeletal: Pain with movement and muscular tenderness present. Cardiovascular:      Rate and Rhythm: Normal rate and regular rhythm. Pulses: Normal pulses. Heart sounds: Normal heart sounds. Pulmonary:      Effort: Pulmonary effort is normal.      Breath sounds: Normal breath sounds. Musculoskeletal:      Cervical back: He exhibits decreased range of motion (d/t pain with ROM), tenderness (right SCM, scapular area), pain and spasm. Lymphadenopathy:      Cervical: No cervical adenopathy. Neurological:      General: No focal deficit present. Mental Status: He is alert. Mental status is at baseline. Cranial Nerves: No cranial nerve deficit. Motor: No weakness. Gait: Gait normal.   Psychiatric:         Mood and Affect: Mood normal.         Behavior: Behavior normal.         Thought Content: Thought content normal.         Judgment: Judgment normal.       BP 98/70   Pulse 82   Resp 16   Wt (!) 302 lb (137 kg)   SpO2 98%   BMI 40.95 kg/m²     Assessment:       Diagnosis Orders   1. Acute pain of right shoulder  cyclobenzaprine (FLEXERIL) 5 MG tablet   2. Right arm pain  US EXTREMITY RIGHT NON VASC LIMITED   3.  Neck pain on right side  US CAROTID ARTERY BILATERAL             Plan:    Pain in the right shoulder/neck/arm - d/t sudden pain will check carotid doppler and will also check us upper extremity to assess for any abnormalities. Patient encouraged to use prn muscle relaxants if results are negative/normal.  Patient encouraged to continue with ROM exercises as needed. Use heat/ice as needed. No follow-ups on file. Orders Placed This Encounter   Procedures    US EXTREMITY RIGHT NON VASC LIMITED     Standing Status:   Future     Standing Expiration Date:   10/8/2021     Order Specific Question:   Reason for exam:     Answer:   right arm pain, headache, neck pain    US CAROTID ARTERY BILATERAL     Standing Status:   Future     Standing Expiration Date:   10/8/2021     Order Specific Question:   Reason for exam:     Answer:   neck pain, headche, tinnitus     Orders Placed This Encounter   Medications    cyclobenzaprine (FLEXERIL) 5 MG tablet     Sig: Take 1 tablet by mouth 2 times daily as needed for Muscle spasms     Dispense:  10 tablet     Refill:  0       Patient given educational materials - see patient instructions. Discussed use, benefit, and side effects of prescribed medications. All patientquestions answered. Pt voiced understanding. Reviewed health maintenance. Instructedto continue current medications, diet and exercise. Patient agreed with treatmentplan. Follow up as directed.      Electronically signed by Nahomy Saeed MD on 10/12/2020 at 8:58 AM

## 2020-10-12 ASSESSMENT — ENCOUNTER SYMPTOMS
GASTROINTESTINAL NEGATIVE: 1
EYES NEGATIVE: 1
RESPIRATORY NEGATIVE: 1
BACK PAIN: 0

## 2020-10-19 RX ORDER — ALBUTEROL SULFATE 90 UG/1
AEROSOL, METERED RESPIRATORY (INHALATION)
Qty: 18 G | Refills: 0 | Status: SHIPPED | OUTPATIENT
Start: 2020-10-19 | End: 2021-05-19 | Stop reason: SDUPTHER

## 2020-10-19 NOTE — TELEPHONE ENCOUNTER
Next Visit Date:  No future appointments.     Health Maintenance   Topic Date Due    HIV screen  12/31/2020 (Originally 11/30/1985)    Shingles Vaccine (2 of 2) 02/03/2021 (Originally 11/30/2018)    Lipid screen  08/05/2024    DTaP/Tdap/Td vaccine (2 - Td) 10/05/2027    Colon cancer screen colonoscopy  04/10/2029    Hepatitis A vaccine  Completed    Hepatitis B vaccine  Completed    Flu vaccine  Completed    Pneumococcal 0-64 years Vaccine  Completed    Hib vaccine  Aged Out    Meningococcal (ACWY) vaccine  Aged Out       Hemoglobin A1C (%)   Date Value   08/05/2019 5.4             ( goal A1C is < 7)   No results found for: LABMICR  LDL Calculated (mg/dL)   Date Value   08/05/2019 59   05/24/2017 59       (goal LDL is <100)   AST (U/L)   Date Value   02/26/2016 78 (H)     ALT (U/L)   Date Value   02/26/2016 45 (H)     BUN (mg/dL)   Date Value   11/08/2019 12     BP Readings from Last 3 Encounters:   10/08/20 98/70   09/29/20 110/73   02/03/20 109/75          (goal 120/80)    All Future Testing planned in [facility]  Lab Frequency Next Occurrence   Testosterone, Free Once 05/05/2020   NM MYOCARDIAL SPECT REST EXERCISE OR RX Once 02/03/2020   XR CHEST STANDARD (2 VW) Once 05/11/2020   US EXTREMITY RIGHT NON VASC LIMITED Once 10/08/2020               Patient Active Problem List:     Allergic rhinitis     Esophageal reflux     Insomnia     End-stage liver disease (HCC)     Portal hypertension with esophageal varices (HCC)     Cirrhosis with alcoholism ([address]     Autoimmune hepatitis ([address]     Hepatic cirrhosis (HCC)     Esophageal varices ([address]     Acquired clotting factor deficiency ([address]     Diverticulosis     Internal hemorrhoid

## 2020-11-25 ENCOUNTER — TELEPHONE (OUTPATIENT)
Dept: FAMILY MEDICINE CLINIC | Age: 50
End: 2020-11-25

## 2020-11-25 NOTE — TELEPHONE ENCOUNTER
Patient called office back to schedule appointment. The only available one prior to his surgery 12/03/20 was today 11/25/20 at 1. Patient said he was working and he could not do that. I informed him it was the only available appointment and placed him on hold to see if there was any other options. Patient hung up.  I tried to call him back twice and got a busy signal.

## 2020-11-25 NOTE — TELEPHONE ENCOUNTER
We need to send progress note and ekg over when we see him and I left a message for the patient that he needs to schedule a appt with alicia to get cleared again the one from may is to old

## 2020-11-30 ENCOUNTER — OFFICE VISIT (OUTPATIENT)
Dept: FAMILY MEDICINE CLINIC | Age: 50
End: 2020-11-30
Payer: COMMERCIAL

## 2020-11-30 VITALS
SYSTOLIC BLOOD PRESSURE: 110 MMHG | BODY MASS INDEX: 41.58 KG/M2 | DIASTOLIC BLOOD PRESSURE: 70 MMHG | HEART RATE: 80 BPM | TEMPERATURE: 97.3 F | HEIGHT: 72 IN | OXYGEN SATURATION: 98 % | WEIGHT: 307 LBS

## 2020-11-30 PROBLEM — M17.0 BILATERAL PRIMARY OSTEOARTHRITIS OF KNEE: Status: ACTIVE | Noted: 2018-11-14

## 2020-11-30 PROBLEM — K76.9 DISEASE OF LIVER: Status: ACTIVE | Noted: 2017-03-15

## 2020-11-30 PROBLEM — N52.9 ERECTILE DYSFUNCTION: Status: ACTIVE | Noted: 2017-09-26

## 2020-11-30 PROCEDURE — 99214 OFFICE O/P EST MOD 30 MIN: CPT | Performed by: FAMILY MEDICINE

## 2020-11-30 RX ORDER — PROPRANOLOL HYDROCHLORIDE 10 MG/1
10 TABLET ORAL 2 TIMES DAILY
COMMUNITY
Start: 2020-11-17

## 2020-11-30 RX ORDER — AZATHIOPRINE 50 MG/1
TABLET ORAL
COMMUNITY
Start: 2020-10-17 | End: 2020-11-30

## 2020-11-30 ASSESSMENT — ENCOUNTER SYMPTOMS
RESPIRATORY NEGATIVE: 1
EYES NEGATIVE: 1
GASTROINTESTINAL NEGATIVE: 1

## 2020-11-30 NOTE — PROGRESS NOTES
[address] CARE  [address] [place] [address]  Dept: [phone]  Dept Fax: [phone]    Patti Moody is a 44 y.o. male who presents today for his medical conditions/complaints as noted below. Patti Moody is c/o of   Chief Complaint   Patient presents with    Cardiac Clearance         HPI:     The patient present for surgical clearance for a left total knee replacement that is scheduled 12/3/2020 with Dr. Jayshree Das at [facility]. The patient has had pain in the knees for years and has failed conservative therapy including injections, otc medications. He does have a history of a clotting problem and had clearance done through his hematologist last week (Dr. Jose M Iqbal). He has had his pre operative testing done and it was reviewed by myself. He denies any personal or family history of any problems or difficulties with anesthesia.         Hemoglobin A1C (%)   Date Value   08/05/2019 5.4             ( goal A1Cis < 7)   No results found for: LABMICR  LDL Calculated (mg/dL)   Date Value   08/05/2019 59   05/24/2017 59       (goal LDL is <100)   AST (U/L)   Date Value   02/26/2016 78 (H)     ALT (U/L)   Date Value   02/26/2016 45 (H)     BUN (mg/dL)   Date Value   11/08/2019 12     BP Readings from Last 3 Encounters:   11/30/20 110/70   10/08/20 98/70   09/29/20 110/73          (goal 120/80)    Past Medical History:   Diagnosis Date    Alcoholism /alcohol abuse ([address] 8/21/2014    Allergic rhinitis, cause unspecified 8/7/2014    Cirrhosis with alcoholism ([address] 8/21/2014    End-stage liver disease ([address] 8/21/2014    Esophageal reflux 8/7/2014    Essential hypertension, benign 8/7/2014    Portal hypertension with esophageal varices ([address] 8/21/2014      Past Surgical History:   Procedure Laterality Date    CHALAZION EXCISION Right 08/2017    UPPER GASTROINTESTINAL ENDOSCOPY  12/13/2016       Family History   Problem Relation Age of Onset    Hypertension Mother        Social History     Tobacco Use    Smoking status: Never Smoker    Smokeless tobacco: Never Used   Substance Use Topics    Alcohol use: No      Current Outpatient Medications   Medication Sig Dispense Refill    diclofenac sodium (VOLTAREN) 1 % GEL diclofenac 1 % topical gel      propranolol (INDERAL) 10 MG tablet Take 10 mg by mouth 2 times daily      albuterol sulfate HFA (VENTOLIN HFA) 108 (90 Base) MCG/ACT inhaler inhale 2 puffs by mouth every 6 hours if needed for wheezing 18 g 0    fluticasone (FLONASE) 50 MCG/ACT nasal spray 1 spray by Nasal route daily 1 Bottle 3    albuterol (PROVENTIL) (2.5 MG/3ML) 0.083% nebulizer solution Take 3 mLs by nebulization every 4 hours as needed for Wheezing or Shortness of Breath 1 Package 2    XIFAXAN 550 MG tablet   0    meloxicam (MOBIC) 15 MG tablet   0    benzoyl peroxide 10 % gel       Multiple Vitamins-Minerals (MULTIVITAMIN ADULT PO) Take 1 tablet by mouth      azaTHIOprine (IMURAN) 50 MG tablet Take 50 mg by mouth 3 times daily   0     No current facility-administered medications for this visit. Allergies   Allergen Reactions    Acetaminophen      Patient has an autoimmune disease and was told that he should not take this medication        Health Maintenance   Topic Date Due    HIV screen  12/31/2020 (Originally 11/30/1985)    Shingles Vaccine (2 of 2) 02/03/2021 (Originally 11/30/2018)    Lipid screen  08/05/2024    DTaP/Tdap/Td vaccine (2 - Td) 10/05/2027    Colon cancer screen colonoscopy  04/10/2029    Hepatitis A vaccine  Completed    Hepatitis B vaccine  Completed    Flu vaccine  Completed    Hib vaccine  Aged Out    Meningococcal (ACWY) vaccine  Aged Out    Pneumococcal 0-64 years Vaccine  Aged Out       Subjective:     Review of Systems   Constitutional: Negative. HENT: Negative. Eyes: Negative. Respiratory: Negative. Cardiovascular: Negative. Gastrointestinal: Negative.     Genitourinary: Negative. Musculoskeletal: Negative. Skin: Negative. Allergic/Immunologic: Negative for environmental allergies, food allergies and immunocompromised state. Neurological: Negative. Psychiatric/Behavioral: Negative. Objective:     Physical Exam  Vitals signs and nursing note reviewed. Constitutional:       General: He is awake. He is not in acute distress. Appearance: Normal appearance. He is obese. He is not ill-appearing, toxic-appearing or diaphoretic. HENT:      Head: Normocephalic and atraumatic. Right Ear: External ear normal.      Left Ear: External ear normal.      Nose: Nose normal.      Mouth/Throat:      Mouth: Mucous membranes are moist.      Pharynx: Oropharynx is clear. No oropharyngeal exudate or posterior oropharyngeal erythema. Eyes:      General:         Right eye: No discharge. Left eye: No discharge. Extraocular Movements: Extraocular movements intact. Conjunctiva/sclera: Conjunctivae normal.      Pupils: Pupils are equal, round, and reactive to light. Neck:      Musculoskeletal: Normal range of motion and neck supple. Cardiovascular:      Rate and Rhythm: Normal rate and regular rhythm. Pulses: Normal pulses. Heart sounds: Normal heart sounds. No murmur. No friction rub. No gallop. Pulmonary:      Effort: Pulmonary effort is normal. No respiratory distress. Breath sounds: Normal breath sounds. No stridor. No wheezing, rhonchi or rales. Chest:      Chest wall: No tenderness. Abdominal:      General: Abdomen is flat. Bowel sounds are normal.      Palpations: Abdomen is soft. Neurological:      General: No focal deficit present. Mental Status: He is alert and oriented to person, place, and time. Mental status is at baseline. Cranial Nerves: No cranial nerve deficit. Motor: No weakness. Coordination: Coordination normal.      Gait: Gait abnormal (antalgic due to knee pain).    Psychiatric: Attention and Perception: Attention normal.         Mood and Affect: Mood and affect normal.         Speech: Speech normal.         Behavior: Behavior normal.         Thought Content: Thought content normal.         Judgment: Judgment normal.       /70   Pulse 80   Temp 97.3 °F (36.3 °C)   Ht 6' (1.829 m)   Wt (!) 307 lb (139.3 kg)   SpO2 98%   BMI 41.64 kg/m²     Assessment:       Diagnosis Orders   1. Bilateral primary osteoarthritis of knee     2. Acquired clotting factor deficiency (HCC)     3. Esophageal varices without bleeding, unspecified esophageal varices type ([address]     4. Portal hypertension with esophageal varices (HCC)               Plan:    OA - labs and EKG reviewed. Patient was already cleared by his hematologist.  Clearance letter will be written. He is at moderate risk due to his history of bleeding and has a hematologist from [facility] he is following closely with for this. Clotting factor - follows with Dr. Kathi Talamantes    Esophageal varices/portal hypertension - stable, continues to follow with GI. No follow-ups on file. No orders of the defined types were placed in this encounter. No orders of the defined types were placed in this encounter. Patient given educational materials - see patient instructions. Discussed use, benefit, and side effects of prescribed medications. All patientquestions answered. Pt voiced understanding. Reviewed health maintenance. Instructedto continue current medications, diet and exercise. Patient agreed with treatmentplan. Follow up as directed.      Electronically signed by Elena Ruano MD on 11/30/2020 at 1:23 PM

## 2020-11-30 NOTE — LETTER
[facility]  [address] [place] [address]  Phone: [phone]  Fax: [phone]    Beronica Navarrete MD        November 30, 2020        To Whom It May Concern:          King Mariaelena was evaluated in my office for pre-operative on November 30, 2020. Based on review of information available to me at this time, the patient appears to be at minimal to moderate increased risk for the planned procedure due to his history of bleeding. He was seen by his hematologist and will be taking a vitamin supplement after surgery. Office note from hematology will be faxed through along with this letter. If you have any questions or concerns, please don't hesitate to call.                Sincerely,        Beronica Navarrete MD

## 2020-11-30 NOTE — LETTER
[facility]  [address] [place] [id number]  Phone: [phone]  Fax: [phone]    Michele Aguila MD        November 30, 2020      [unfilled]                          Monica Alegria M.D. [facility]   [address] [id number]  [phone]  [phone]        November 30, 2020    Caryle Avena      To Whom It May Concern:          Caryle Avena was evaluated in my office for pre-operative on November 30, 2020. Based on review of information available to me at this time, the patient appears to be at moderate increased risk for the planned procedure due to his history of bleeding but he has been cleared by his hematologist.     If you have any questions or concerns, please don't hesitate to call. Sincerely,      Monica Alegria M.D.            Sincerely,        Michele Aguila MD

## 2021-02-01 ENCOUNTER — OFFICE VISIT (OUTPATIENT)
Dept: FAMILY MEDICINE CLINIC | Age: 51
End: 2021-02-01
Payer: COMMERCIAL

## 2021-02-01 VITALS
WEIGHT: 312.2 LBS | HEIGHT: 72 IN | BODY MASS INDEX: 42.29 KG/M2 | DIASTOLIC BLOOD PRESSURE: 84 MMHG | OXYGEN SATURATION: 96 % | HEART RATE: 74 BPM | SYSTOLIC BLOOD PRESSURE: 122 MMHG | RESPIRATION RATE: 14 BRPM | TEMPERATURE: 96.6 F

## 2021-02-01 DIAGNOSIS — Z12.5 PROSTATE CANCER SCREENING: ICD-10-CM

## 2021-02-01 DIAGNOSIS — Z96.652 S/P TKR (TOTAL KNEE REPLACEMENT), LEFT: Primary | ICD-10-CM

## 2021-02-01 DIAGNOSIS — Z13.220 LIPID SCREENING: ICD-10-CM

## 2021-02-01 PROCEDURE — 99214 OFFICE O/P EST MOD 30 MIN: CPT | Performed by: PHYSICIAN ASSISTANT

## 2021-02-01 ASSESSMENT — ENCOUNTER SYMPTOMS
CHEST TIGHTNESS: 0
BLOOD IN STOOL: 0
RECTAL PAIN: 0
BACK PAIN: 0
DIARRHEA: 0
CONSTIPATION: 0
ABDOMINAL PAIN: 0
COUGH: 0
VOMITING: 0
SHORTNESS OF BREATH: 0
ANAL BLEEDING: 0
ABDOMINAL DISTENTION: 0
NAUSEA: 0
WHEEZING: 0

## 2021-02-01 ASSESSMENT — PATIENT HEALTH QUESTIONNAIRE - PHQ9
SUM OF ALL RESPONSES TO PHQ9 QUESTIONS 1 & 2: 0
1. LITTLE INTEREST OR PLEASURE IN DOING THINGS: 0
SUM OF ALL RESPONSES TO PHQ QUESTIONS 1-9: 0
SUM OF ALL RESPONSES TO PHQ QUESTIONS 1-9: 0

## 2021-02-01 NOTE — PROGRESS NOTES
Bottle 3    benzoyl peroxide 10 % gel       Multiple Vitamins-Minerals (MULTIVITAMIN ADULT PO) Take 1 tablet by mouth      azaTHIOprine (IMURAN) 50 MG tablet Take 50 mg by mouth 3 times daily   0    albuterol (PROVENTIL) (2.5 MG/3ML) 0.083% nebulizer solution Take 3 mLs by nebulization every 4 hours as needed for Wheezing or Shortness of Breath 1 Package 2    XIFAXAN 550 MG tablet   0    meloxicam (MOBIC) 15 MG tablet   0     No current facility-administered medications for this visit. Social History     Tobacco Use    Smoking status: Never Smoker    Smokeless tobacco: Never Used   Substance Use Topics    Alcohol use: No    Drug use: Not on file       Family History   Problem Relation Age of Onset    Hypertension Mother         Review of Systems   Constitutional: Negative for appetite change, chills, diaphoresis, fatigue, fever and unexpected weight change. Respiratory: Negative for cough, chest tightness, shortness of breath and wheezing. Cardiovascular: Negative for chest pain, palpitations and leg swelling. Gastrointestinal: Negative for abdominal distention, abdominal pain, anal bleeding, blood in stool, constipation, diarrhea, nausea, rectal pain and vomiting. Genitourinary: Negative for decreased urine volume, difficulty urinating, dysuria, frequency and urgency. Musculoskeletal: Positive for arthralgias and gait problem. Negative for back pain, joint swelling, myalgias, neck pain and neck stiffness. Skin: Negative. Neurological: Negative for dizziness, syncope, weakness, light-headedness, numbness and headaches. Psychiatric/Behavioral: Negative. Physical Exam  Vitals signs and nursing note reviewed. Constitutional:       General: He is not in acute distress. Appearance: Normal appearance. He is well-developed. He is not ill-appearing, toxic-appearing or diaphoretic. HENT:      Head: Normocephalic and atraumatic. Eyes:      General: No scleral icterus. Right eye: No discharge. Left eye: No discharge. Conjunctiva/sclera: Conjunctivae normal.   Neck:      Musculoskeletal: Normal range of motion and neck supple. Thyroid: No thyroid mass, thyromegaly or thyroid tenderness. Cardiovascular:      Rate and Rhythm: Normal rate and regular rhythm. Heart sounds: Normal heart sounds. No murmur. No friction rub. No gallop. Pulmonary:      Effort: Pulmonary effort is normal. No respiratory distress. Breath sounds: Normal breath sounds. No stridor. No wheezing, rhonchi or rales. Chest:      Chest wall: No tenderness. Abdominal:      General: Bowel sounds are normal.      Palpations: Abdomen is soft. Tenderness: There is no abdominal tenderness. Musculoskeletal: Normal range of motion. General: No swelling, tenderness, deformity or signs of injury. Right lower leg: No edema. Left lower leg: No edema. Skin:     General: Skin is warm and dry. Coloration: Skin is not jaundiced or pale. Findings: No bruising, erythema, lesion or rash. Neurological:      General: No focal deficit present. Mental Status: He is alert and oriented to person, place, and time. Cranial Nerves: No cranial nerve deficit. Sensory: No sensory deficit. Motor: No weakness. Coordination: Coordination normal.      Gait: Gait normal.      Deep Tendon Reflexes: Reflexes normal.   Psychiatric:         Attention and Perception: Attention and perception normal.         Mood and Affect: Mood and affect normal.         Speech: Speech normal.         Behavior: Behavior normal. Behavior is cooperative. Thought Content:  Thought content normal.         Cognition and Memory: Cognition and memory normal.         Judgment: Judgment normal.           Vitals:    02/01/21 0912   BP: 122/84   Pulse: 74   Resp: 14   Temp: 96.6 °F (35.9 °C)   SpO2: 96%   Weight: (!) 312 lb 3.2 oz (141.6 kg)   Height: 6' (1.829 m)     BP Readings from Last 3 Encounters:   02/01/21 122/84   11/30/20 110/70   10/08/20 98/70              DIAGNOSTIC FINDINGS:  CBC:  Lab Results   Component Value Date    WBC 4.33 11/30/2020    WBC 3.6 02/26/2016    HGB 15.5 11/30/2020     11/30/2020       BMP:    Lab Results   Component Value Date     11/30/2020    K 3.7 11/30/2020     11/30/2020    CO2 28 11/30/2020    BUN 11 11/30/2020    CREATININE 0.77 11/30/2020    GLUCOSE 133 11/30/2020         FASTING LIPID PANEL:  Lab Results   Component Value Date    CHOL 120 05/24/2017    HDL 50 08/05/2019    TRIG 46 05/24/2017       No results found for this visit on 02/01/21. ASSESSMENT AND PLAN:   Diagnosis Orders   1. S/P TKR (total knee replacement), left  Continue PT   2. Lipid screening  Lipid, Fasting   3. Prostate cancer screening  PSA Screening       FOLLOW UP AND INSTRUCTIONS:  Return in about 3 months (around 5/1/2021), or if symptoms worsen or fail to improve. · Discussed use, benefit, and side effects of prescribed medications. Barriers to medication compliance addressed. All patient questions answered. Pt voiced understanding. · Patient instructed to return to the office if symptoms do not resolve or go directly to the ER if the symptoms worsen - patient voiced understanding. · Patient given educational materials - see patient instructions    R[address] [facility]  2/1/2021, 10:11 AM    This note is created with the assistance of a speech-recognition program. While intending to generate a document that actually reflects the content of the visit, the document can still have some mistakes which may not have been identified and corrected by editing.

## 2021-05-19 ENCOUNTER — OFFICE VISIT (OUTPATIENT)
Dept: FAMILY MEDICINE CLINIC | Age: 51
End: 2021-05-19
Payer: COMMERCIAL

## 2021-05-19 VITALS
WEIGHT: 315 LBS | HEIGHT: 72 IN | SYSTOLIC BLOOD PRESSURE: 119 MMHG | BODY MASS INDEX: 42.66 KG/M2 | HEART RATE: 71 BPM | TEMPERATURE: 97 F | OXYGEN SATURATION: 99 % | DIASTOLIC BLOOD PRESSURE: 82 MMHG

## 2021-05-19 DIAGNOSIS — J30.1 SEASONAL ALLERGIC RHINITIS DUE TO POLLEN: ICD-10-CM

## 2021-05-19 DIAGNOSIS — K75.4 AUTOIMMUNE HEPATITIS (HCC): ICD-10-CM

## 2021-05-19 DIAGNOSIS — Z11.59 NEED FOR HEPATITIS C SCREENING TEST: ICD-10-CM

## 2021-05-19 DIAGNOSIS — Z11.4 ENCOUNTER FOR SCREENING FOR HIV: ICD-10-CM

## 2021-05-19 DIAGNOSIS — J45.20 MILD INTERMITTENT ASTHMA WITHOUT COMPLICATION: Primary | ICD-10-CM

## 2021-05-19 DIAGNOSIS — Z12.5 PROSTATE CANCER SCREENING: ICD-10-CM

## 2021-05-19 DIAGNOSIS — K76.6 PORTAL HYPERTENSION WITH ESOPHAGEAL VARICES (HCC): ICD-10-CM

## 2021-05-19 DIAGNOSIS — Z13.220 LIPID SCREENING: ICD-10-CM

## 2021-05-19 DIAGNOSIS — I85.00 PORTAL HYPERTENSION WITH ESOPHAGEAL VARICES (HCC): ICD-10-CM

## 2021-05-19 DIAGNOSIS — I85.00 ESOPHAGEAL VARICES WITHOUT BLEEDING, UNSPECIFIED ESOPHAGEAL VARICES TYPE (HCC): ICD-10-CM

## 2021-05-19 DIAGNOSIS — Z13.1 DIABETES MELLITUS SCREENING: ICD-10-CM

## 2021-05-19 PROBLEM — D69.6 THROMBOCYTOPENIA (HCC): Status: ACTIVE | Noted: 2017-08-02

## 2021-05-19 PROCEDURE — 99214 OFFICE O/P EST MOD 30 MIN: CPT | Performed by: FAMILY MEDICINE

## 2021-05-19 RX ORDER — FEXOFENADINE HCL 180 MG/1
180 TABLET ORAL DAILY
Qty: 30 TABLET | Refills: 5 | Status: SHIPPED | OUTPATIENT
Start: 2021-05-19

## 2021-05-19 RX ORDER — ALBUTEROL SULFATE 90 UG/1
AEROSOL, METERED RESPIRATORY (INHALATION)
Qty: 2 INHALER | Refills: 1 | Status: SHIPPED | OUTPATIENT
Start: 2021-05-19 | End: 2021-08-16 | Stop reason: SDUPTHER

## 2021-05-19 RX ORDER — FLUTICASONE PROPIONATE 110 UG/1
2 AEROSOL, METERED RESPIRATORY (INHALATION) 2 TIMES DAILY
Qty: 1 INHALER | Refills: 3 | Status: SHIPPED | OUTPATIENT
Start: 2021-05-19 | End: 2021-08-16 | Stop reason: SDUPTHER

## 2021-05-19 RX ORDER — FOLIC ACID 1 MG/1
TABLET ORAL
COMMUNITY
Start: 2021-03-23

## 2021-05-19 RX ORDER — ASPIRIN 325 MG
TABLET ORAL
COMMUNITY
End: 2022-01-20 | Stop reason: ALTCHOICE

## 2021-05-19 RX ORDER — CYCLOBENZAPRINE HCL 5 MG
TABLET ORAL
COMMUNITY
End: 2022-01-25 | Stop reason: SDUPTHER

## 2021-05-19 RX ORDER — FLUTICASONE PROPIONATE 50 MCG
1 SPRAY, SUSPENSION (ML) NASAL DAILY
Qty: 1 BOTTLE | Refills: 3 | Status: SHIPPED | OUTPATIENT
Start: 2021-05-19

## 2021-05-19 RX ORDER — ALBUTEROL SULFATE 90 UG/1
AEROSOL, METERED RESPIRATORY (INHALATION)
Qty: 18 G | Refills: 3 | Status: SHIPPED | OUTPATIENT
Start: 2021-05-19 | End: 2021-05-19 | Stop reason: SDUPTHER

## 2021-05-19 SDOH — ECONOMIC STABILITY: FOOD INSECURITY: WITHIN THE PAST 12 MONTHS, YOU WORRIED THAT YOUR FOOD WOULD RUN OUT BEFORE YOU GOT MONEY TO BUY MORE.: NEVER TRUE

## 2021-05-19 NOTE — PROGRESS NOTES
[address] PR[facility]  [address] [place] [id number]  Dept: [phone]  Dept Fax: [phone]    Mandeep Cisneros is a 44 y.o. male who is a Established patient, who presents today for his medical conditions/complaints as noted below:  Chief Complaint   Patient presents with    Establish Care    Asthma         HPI:     The patient is a 68-year-old male with a past medical history of portal hypertension with esophageal varices, allergic rhinitis, ETOH + autoimmune hepatitis, diverticulosis, clotting factor deficiency, thrombocytopenia who presents today to establish [facility]. The patient does follow closely with GI, last visit 2/17/2021. They are monitoring his liver disease closely. From GI last note:  \"Labs 1/29/15: AMA neg, ASMA + (titers have fluctuated from borderline to 1:80); SID neg; ceruloplasmin normal; A1AT phenotype wnl; iron studies with saturation 31%, ferritin has ranged from the 400's - 800's  - review of his chart reveals elevated AST and ALT dating back to 08/03/2014 when he initially entered our system and initially presented.  His liver enzymes peaked on 08/13/2014 with , ALT 74, T bili 21.6 and the bili 10.3.  These improved over time but remain elevated with most recent labs on 08/18/2016 showing AST 67, ALT normal, alk-phos 185, total bilirubin 2.1.  This is despite being started azathioprine 05/2015 and continued on prednisone 5 mg daily.  Of note, alk-phos was generally unremarkable and then elevated 11/05/2014 as remained persistently elevated since. \". He has been following with them doing every 3 month labs due to azathioprine and the patient being on xifaxan. He has been seen also by hematology Dr. Fauzia Gabriel for his history of coagulopathy. Patient had a total knee replacement last year without any incidence of clot at that time.         Hemoglobin A1C (%)   Date Value   11/30/2020 5.4   08/05/2019 5.4             ( goal A1Cis < 7)   No results found for: LABMICR  LDL Calculated (mg/dL)   Date Value   08/05/2019 59   05/24/2017 59       (goal LDL is <100)   AST (U/L)   Date Value   02/26/2016 78 (H)     ALT (U/L)   Date Value   02/26/2016 45 (H)     BUN (mg/dL)   Date Value   11/30/2020 11     BP Readings from Last 3 Encounters:   05/19/21 119/82   02/01/21 122/84   11/30/20 110/70          (goal 120/80)    Past Medical History:   Diagnosis Date    Alcoholism /alcohol abuse ([facility] 8/21/2014    Allergic rhinitis, cause unspecified 8/7/2014    Cirrhosis with alcoholism ([facility] 8/21/2014    End-stage liver disease ([facility] 8/21/2014    Esophageal reflux 8/7/2014    Essential hypertension, benign 8/7/2014    Portal hypertension with esophageal varices ([facility] 8/21/2014      Past Surgical History:   Procedure Laterality Date    CHALAZION EXCISION Right 08/2017    UPPER GASTROINTESTINAL ENDOSCOPY  12/13/2016       Family History   Problem Relation Age of Onset    Hypertension Mother        Social History     Tobacco Use    Smoking status: Never Smoker    Smokeless tobacco: Never Used   Substance Use Topics    Alcohol use: No      Current Outpatient Medications   Medication Sig Dispense Refill    aspirin 325 MG tablet aspirin 325 mg tablet   take 1 tablet by mouth every 12 hours      cyclobenzaprine (FLEXERIL) 5 MG tablet cyclobenzaprine 5 mg tablet   take 1 tablet by mouth twice a day if needed for muscle spasm      folic acid (FOLVITE) 1 MG tablet take 1 tablet by mouth once daily      fluticasone (FLONASE) 50 MCG/ACT nasal spray 1 spray by Nasal route daily 1 Bottle 3    fluticasone (FLOVENT HFA) 110 MCG/ACT inhaler Inhale 2 puffs into the lungs 2 times daily 1 Inhaler 3    fexofenadine (ALLEGRA) 180 MG tablet Take 1 tablet by mouth daily 30 tablet 5    albuterol sulfate HFA (VENTOLIN HFA) 108 (90 Base) MCG/ACT inhaler inhale 2 puffs by mouth every 6 hours if needed for wheezing 2 Inhaler 1    diclofenac sodium (VOLTAREN) 1 % GEL diclofenac 1 % topical gel      propranolol (INDERAL) 10 MG tablet Take 10 mg by mouth 2 times daily      albuterol (PROVENTIL) (2.5 MG/3ML) 0.083% nebulizer solution Take 3 mLs by nebulization every 4 hours as needed for Wheezing or Shortness of Breath 1 Package 2    XIFAXAN 550 MG tablet   0    meloxicam (MOBIC) 15 MG tablet   0    Multiple Vitamins-Minerals (MULTIVITAMIN ADULT PO) Take 1 tablet by mouth      azaTHIOprine (IMURAN) 50 MG tablet Take 50 mg by mouth 3 times daily   0     No current facility-administered medications for this visit. Allergies   Allergen Reactions    Acetaminophen      Patient has an autoimmune disease and was told that he should not take this medication        Health Maintenance   Topic Date Due    Hepatitis C screen  Never done    HIV screen  Never done    Shingles Vaccine (2 of 2) 11/30/2018    Diabetes screen  11/30/2023    Lipid screen  08/05/2024    DTaP/Tdap/Td vaccine (2 - Td) 10/05/2027    Colon cancer screen colonoscopy  04/10/2029    Pneumococcal 0-64 years Vaccine (2 of 2) 11/30/2033    Hepatitis A vaccine  Completed    Hepatitis B vaccine  Completed    Flu vaccine  Completed    COVID-19 Vaccine  Completed    Hib vaccine  Aged Out    Meningococcal (ACWY) vaccine  Aged Out       Subjective:     Review of Systems   Constitutional: Negative. HENT: Positive for congestion, postnasal drip, rhinorrhea, sinus pressure and sinus pain. Eyes: Negative. Respiratory: Negative. Cardiovascular: Negative. Gastrointestinal: Negative. Genitourinary: Negative. Musculoskeletal: Negative. Skin: Negative. Allergic/Immunologic: Negative for environmental allergies, food allergies and immunocompromised state. Neurological: Negative. Psychiatric/Behavioral: Negative. Objective:     Physical Exam  Vitals and nursing note reviewed. Constitutional:       General: He is awake. He is not in acute distress.      Appearance: Normal appearance. He is obese. He is not ill-appearing, toxic-appearing or diaphoretic. HENT:      Head: Normocephalic and atraumatic. Right Ear: Tympanic membrane, ear canal and external ear normal.      Left Ear: Tympanic membrane, ear canal and external ear normal.      Nose: Nose normal.      Mouth/Throat:      Mouth: Mucous membranes are moist.   Eyes:      Extraocular Movements: Extraocular movements intact. Conjunctiva/sclera: Conjunctivae normal.      Pupils: Pupils are equal, round, and reactive to light. Cardiovascular:      Rate and Rhythm: Normal rate and regular rhythm. Pulses: Normal pulses. Heart sounds: Normal heart sounds. No murmur heard. No friction rub. No gallop. Pulmonary:      Effort: Pulmonary effort is normal. No respiratory distress. Breath sounds: Normal breath sounds. No stridor. No wheezing, rhonchi or rales. Abdominal:      General: Abdomen is flat. Bowel sounds are normal.      Palpations: Abdomen is soft. Musculoskeletal:         General: Normal range of motion. Cervical back: Normal range of motion and neck supple. Skin:     Capillary Refill: Capillary refill takes less than 2 seconds. Neurological:      General: No focal deficit present. Mental Status: He is alert and oriented to person, place, and time. Mental status is at baseline. Cranial Nerves: No cranial nerve deficit. Sensory: No sensory deficit. Motor: No weakness. Coordination: Coordination normal.      Gait: Gait normal.   Psychiatric:         Attention and Perception: Attention normal.         Mood and Affect: Mood and affect normal.         Speech: Speech normal.         Behavior: Behavior normal.         Thought Content: Thought content normal.         Judgment: Judgment normal.       /82   Pulse 71   Temp 97 °F (36.1 °C)   Ht 6' (1.829 m)   Wt (!) 316 lb (143.3 kg)   SpO2 99%   BMI 42.86 kg/m²     Assessment:       Diagnosis Orders   1.  Mild intermittent asthma without complication  fluticasone (FLOVENT HFA) 110 MCG/ACT inhaler    fexofenadine (ALLEGRA) 180 MG tablet    albuterol sulfate HFA (VENTOLIN HFA) 108 (90 Base) MCG/ACT inhaler    DISCONTINUED: albuterol sulfate HFA (VENTOLIN HFA) 108 (90 Base) MCG/ACT inhaler   2. Portal hypertension with esophageal varices (HCC)  Comprehensive Metabolic Panel, Fasting    TSH With Reflex Ft4   3. Esophageal varices without bleeding, unspecified esophageal varices type ([facility]     4. Prostate cancer screening  PSA Screening   5. Seasonal allergic rhinitis due to pollen  fluticasone (FLONASE) 50 MCG/ACT nasal spray    fexofenadine (ALLEGRA) 180 MG tablet   6. Lipid screening  Lipid, Fasting   7. [facility]  Comprehensive Metabolic Panel, Fasting    TSH With Reflex Ft4   8. Diabetes mellitus screening  Hemoglobin A1C   9. Need for hepatitis C screening test  Hepatitis C Antibody   10. Encounter for screening for HIV  HIV Screen             Plan:    Asthma without complication-the patient does not needing his inhaler in spring and fall more often. He was it is allergy induced. He has been using his inhaler approximately 2-4 times per week sometimes even more than that in the last couple months. He has not been on a steroid inhaler. Today I discussed with him the importance of good control of asthma. This is determined based upon how much he uses his inhaler as well as spirometry. At this time we will have him take Allegra consistently, uses albuterol as needed and add Flovent. If the patient symptoms worsen he is to call our office and we will add spirometry medication adjustments. Portal hypertension with esophageal varices/esophageal varices without bleeding/autoimmune hepatitis-the patient is following with [facility]. At this time they have him on Imuran 50 mg 3 times daily, Xifaxan as well. Patient denies any bleeding, jaundice or any signs of liver failure at this time.   Patient encouraged to follow closely with them. Labs are being drawn every 3 months. Obesity-patient was referred to bariatrics through GI at [address] [place] [address]. Encourage patient to work out and eat a healthy diet. No follow-ups on file. Orders Placed This Encounter   Procedures    Lipid, Fasting     Standing Status:   Future     Standing Expiration Date:   2022    Comprehensive Metabolic Panel, Fasting     Standing Status:   Future     Standing Expiration Date:   2022    PSA Screening     Standing Status:   Future     Standing Expiration Date:   2022    TSH With Reflex Ft4     Standing Status:   Future     Standing Expiration Date:   2022    Hemoglobin A1C     Standing Status:   Future     Standing Expiration Date:   2022    Hepatitis C Antibody     Standing Status:   Future     Standing Expiration Date:   2022    HIV Screen     Standing Status:   Future     Standing Expiration Date:   2022     Orders Placed This Encounter   Medications    DISCONTD: albuterol sulfate HFA (VENTOLIN HFA) 108 (90 Base) MCG/ACT inhaler     Sig: inhale 2 puffs by mouth every 6 hours if needed for wheezing     Dispense:  18 g     Refill:  3    fluticasone (FLONASE) 50 MCG/ACT nasal spray     Si spray by Nasal route daily     Dispense:  1 Bottle     Refill:  3    fluticasone (FLOVENT HFA) 110 MCG/ACT inhaler     Sig: Inhale 2 puffs into the lungs 2 times daily     Dispense:  1 Inhaler     Refill:  3    fexofenadine (ALLEGRA) 180 MG tablet     Sig: Take 1 tablet by mouth daily     Dispense:  30 tablet     Refill:  5    albuterol sulfate HFA (VENTOLIN HFA) 108 (90 Base) MCG/ACT inhaler     Sig: inhale 2 puffs by mouth every 6 hours if needed for wheezing     Dispense:  2 Inhaler     Refill:  1       Patient given educational materials - see patient instructions. Discussed use, benefit, and side effects of prescribed medications. All patientquestions answered. Pt voiced understanding.  Reviewed

## 2021-05-23 ASSESSMENT — ENCOUNTER SYMPTOMS
EYES NEGATIVE: 1
SINUS PRESSURE: 1
GASTROINTESTINAL NEGATIVE: 1
SINUS PAIN: 1
RHINORRHEA: 1
RESPIRATORY NEGATIVE: 1

## 2021-06-14 DIAGNOSIS — K76.6 PORTAL HYPERTENSION WITH ESOPHAGEAL VARICES (HCC): ICD-10-CM

## 2021-06-14 DIAGNOSIS — Z12.5 PROSTATE CANCER SCREENING: ICD-10-CM

## 2021-06-14 DIAGNOSIS — Z11.4 ENCOUNTER FOR SCREENING FOR HIV: ICD-10-CM

## 2021-06-14 DIAGNOSIS — K75.4 AUTOIMMUNE HEPATITIS (HCC): ICD-10-CM

## 2021-06-14 DIAGNOSIS — Z13.1 DIABETES MELLITUS SCREENING: ICD-10-CM

## 2021-06-14 DIAGNOSIS — Z11.59 NEED FOR HEPATITIS C SCREENING TEST: ICD-10-CM

## 2021-06-14 DIAGNOSIS — Z13.220 LIPID SCREENING: ICD-10-CM

## 2021-06-14 DIAGNOSIS — I85.00 PORTAL HYPERTENSION WITH ESOPHAGEAL VARICES (HCC): ICD-10-CM

## 2021-06-14 LAB
ALBUMIN SERPL-MCNC: 3.6 G/DL
ALP BLD-CCNC: 88 U/L
ALT SERPL-CCNC: 13 U/L
ANTIBODY: NORMAL
AST SERPL-CCNC: 26 U/L
AVERAGE GLUCOSE: 105
BILIRUB SERPL-MCNC: 0.7 MG/DL (ref 0.1–1.4)
BUN BLDV-MCNC: 14 MG/DL
CALCIUM SERPL-MCNC: 9.2 MG/DL
CHLORIDE BLD-SCNC: 110 MMOL/L
CHOLESTEROL, FASTING: 114
CO2: 24 MMOL/L
CREAT SERPL-MCNC: 0.72 MG/DL
GLUCOSE FASTING: 83 MG/DL
HBA1C MFR BLD: 5.3 %
HDLC SERPL-MCNC: 52 MG/DL (ref 35–70)
HIV AG/AB: NORMAL
LDL CHOLESTEROL CALCULATED: 53 MG/DL (ref 0–160)
POTASSIUM SERPL-SCNC: 3.9 MMOL/L
PROSTATE SPECIFIC ANTIGEN: 0.29 NG/ML
SODIUM BLD-SCNC: 143 MMOL/L
TOTAL PROTEIN: 7.2 G/DL (ref 6.4–8.2)
TRIGLYCERIDE, FASTING: 46
TSH SERPL DL<=0.05 MIU/L-ACNC: 1.5 UIU/ML

## 2021-08-05 ENCOUNTER — TELEPHONE (OUTPATIENT)
Dept: FAMILY MEDICINE CLINIC | Age: 51
End: 2021-08-05

## 2021-08-05 NOTE — TELEPHONE ENCOUNTER
----- Message from Su Donald sent at 8/5/2021  8:15 AM EDT -----  Subject: Message to Provider    QUESTIONS  Information for Provider? Patient is requesting an appointment in regards   to his asthma the patient refused NT, patient states he has intermittent   complications with breathing  ---------------------------------------------------------------------------  --------------  CALL BACK INFO  What is the best way for the office to contact you? OK to leave message on   voicemail  Preferred Call Back Phone Number? [phone]  ---------------------------------------------------------------------------  --------------  SCRIPT ANSWERS  Relationship to Patient? Self  (Is the patient requesting to be seen urgently for their symptoms?)? No  Is this follow up request related to routine Diabetes Management? No  Are you having any new concerns about your existing condition?  Yes

## 2021-08-16 ENCOUNTER — OFFICE VISIT (OUTPATIENT)
Dept: FAMILY MEDICINE CLINIC | Age: 51
End: 2021-08-16
Payer: COMMERCIAL

## 2021-08-16 VITALS
HEART RATE: 90 BPM | HEIGHT: 72 IN | SYSTOLIC BLOOD PRESSURE: 115 MMHG | TEMPERATURE: 97.6 F | WEIGHT: 315 LBS | DIASTOLIC BLOOD PRESSURE: 79 MMHG | OXYGEN SATURATION: 98 % | BODY MASS INDEX: 42.66 KG/M2

## 2021-08-16 DIAGNOSIS — J45.20 MILD INTERMITTENT ASTHMA WITHOUT COMPLICATION: Primary | ICD-10-CM

## 2021-08-16 PROCEDURE — 99213 OFFICE O/P EST LOW 20 MIN: CPT | Performed by: STUDENT IN AN ORGANIZED HEALTH CARE EDUCATION/TRAINING PROGRAM

## 2021-08-16 RX ORDER — ALBUTEROL SULFATE 2.5 MG/3ML
2.5 SOLUTION RESPIRATORY (INHALATION) EVERY 4 HOURS PRN
Qty: 1 PACKAGE | Refills: 2 | Status: SHIPPED | OUTPATIENT
Start: 2021-08-16 | End: 2022-07-19

## 2021-08-16 RX ORDER — ALBUTEROL SULFATE 90 UG/1
AEROSOL, METERED RESPIRATORY (INHALATION)
Qty: 2 INHALER | Refills: 1 | Status: SHIPPED | OUTPATIENT
Start: 2021-08-16

## 2021-08-16 RX ORDER — FLUTICASONE PROPIONATE 110 UG/1
2 AEROSOL, METERED RESPIRATORY (INHALATION) 2 TIMES DAILY
Qty: 1 INHALER | Refills: 3 | Status: SHIPPED | OUTPATIENT
Start: 2021-08-16 | End: 2022-01-20 | Stop reason: SDUPTHER

## 2021-08-16 ASSESSMENT — ENCOUNTER SYMPTOMS
SHORTNESS OF BREATH: 1
CHEST TIGHTNESS: 0
VOMITING: 0
COUGH: 0
WHEEZING: 1
ABDOMINAL PAIN: 0
NAUSEA: 0
DIARRHEA: 0
EYE DISCHARGE: 0
SORE THROAT: 0
CONSTIPATION: 0

## 2021-08-16 NOTE — PROGRESS NOTES
Date    CHALAZION EXCISION Right 08/2017    UPPER GASTROINTESTINAL ENDOSCOPY  12/13/2016       Family History   Problem Relation Age of Onset    Hypertension Mother        Social History     Tobacco Use    Smoking status: Never Smoker    Smokeless tobacco: Never Used   Substance Use Topics    Alcohol use: No      Current Outpatient Medications   Medication Sig Dispense Refill    albuterol (PROVENTIL) (2.5 MG/3ML) 0.083% nebulizer solution Take 3 mLs by nebulization every 4 hours as needed for Wheezing or Shortness of Breath 1 Package 2    albuterol sulfate HFA (VENTOLIN HFA) 108 (90 Base) MCG/ACT inhaler inhale 2 puffs by mouth every 6 hours if needed for wheezing 2 Inhaler 1    fluticasone (FLOVENT HFA) 110 MCG/ACT inhaler Inhale 2 puffs into the lungs 2 times daily 1 Inhaler 3    aspirin 325 MG tablet aspirin 325 mg tablet   take 1 tablet by mouth every 12 hours      cyclobenzaprine (FLEXERIL) 5 MG tablet cyclobenzaprine 5 mg tablet   take 1 tablet by mouth twice a day if needed for muscle spasm      folic acid (FOLVITE) 1 MG tablet take 1 tablet by mouth once daily      fluticasone (FLONASE) 50 MCG/ACT nasal spray 1 spray by Nasal route daily 1 Bottle 3    fexofenadine (ALLEGRA) 180 MG tablet Take 1 tablet by mouth daily 30 tablet 5    diclofenac sodium (VOLTAREN) 1 % GEL diclofenac 1 % topical gel      propranolol (INDERAL) 10 MG tablet Take 10 mg by mouth 2 times daily      XIFAXAN 550 MG tablet   0    meloxicam (MOBIC) 15 MG tablet   0    Multiple Vitamins-Minerals (MULTIVITAMIN ADULT PO) Take 1 tablet by mouth      azaTHIOprine (IMURAN) 50 MG tablet Take 50 mg by mouth 3 times daily   0     No current facility-administered medications for this visit.      Allergies   Allergen Reactions    Acetaminophen      Patient has an autoimmune disease and was told that he should not take this medication        Health Maintenance   Topic Date Due    HIV screen  Never done    Pneumococcal 0-64 years Vaccine (2 of 4 - PCV13) 10/05/2018    Shingles Vaccine (2 of 2) 11/30/2018    Flu vaccine (1) 09/01/2021    Diabetes screen  06/11/2024    Lipid screen  06/11/2026    DTaP/Tdap/Td vaccine (2 - Td or Tdap) 10/05/2027    Colon cancer screen colonoscopy  04/10/2029    Hepatitis A vaccine  Completed    Hepatitis B vaccine  Completed    COVID-19 Vaccine  Completed    Hepatitis C screen  Completed    Hib vaccine  Aged Out    Meningococcal (ACWY) vaccine  Aged Out       Subjective:     Review of Systems   Constitutional: Negative for appetite change, fatigue and fever. HENT: Negative for congestion, ear pain, hearing loss and sore throat. Eyes: Negative for discharge and visual disturbance. Respiratory: Positive for shortness of breath and wheezing. Negative for cough and chest tightness. Cardiovascular: Negative for chest pain, palpitations and leg swelling. Gastrointestinal: Negative for abdominal pain, constipation, diarrhea, nausea and vomiting. Genitourinary: Negative for flank pain, frequency, hematuria and urgency. Musculoskeletal: Negative for arthralgias, gait problem, joint swelling and myalgias. Skin: Negative. Neurological: Negative for dizziness, weakness, numbness and headaches. Psychiatric/Behavioral: Negative. Negative for dysphoric mood. The patient is not nervous/anxious. Objective:     Physical Exam  Vitals reviewed. Constitutional:       Appearance: Normal appearance. He is normal weight. HENT:      Head: Normocephalic and atraumatic. Nose: Nose normal.      Mouth/Throat:      Mouth: Mucous membranes are moist.      Pharynx: Oropharynx is clear. Eyes:      Extraocular Movements: Extraocular movements intact. Conjunctiva/sclera: Conjunctivae normal.      Pupils: Pupils are equal, round, and reactive to light. Cardiovascular:      Rate and Rhythm: Normal rate and regular rhythm. Heart sounds: Normal heart sounds. No murmur heard.    No gallop. Pulmonary:      Effort: Pulmonary effort is normal. No respiratory distress. Breath sounds: Normal breath sounds. No stridor. No wheezing. Abdominal:      General: Bowel sounds are normal. There is no distension. Palpations: Abdomen is soft. Tenderness: There is no abdominal tenderness. There is no guarding or rebound. Musculoskeletal:         General: No swelling or tenderness. Normal range of motion. Cervical back: Normal range of motion and neck supple. Skin:     General: Skin is warm and dry. Coloration: Skin is not jaundiced. Findings: No rash. Neurological:      General: No focal deficit present. Mental Status: He is alert and oriented to person, place, and time. Psychiatric:         Mood and Affect: Mood normal.         Behavior: Behavior normal.         Thought Content: Thought content normal.         Judgment: Judgment normal.       /79   Pulse 90   Temp 97.6 °F (36.4 °C)   Ht 6' (1.829 m)   Wt (!) 322 lb 3.2 oz (146.1 kg)   SpO2 98%   BMI 43.70 kg/m²     Assessment/Plan:   1. Mild intermittent asthma without complication  -     albuterol (PROVENTIL) (2.5 MG/3ML) 0.083% nebulizer solution; Take 3 mLs by nebulization every 4 hours as needed for Wheezing or Shortness of Breath, Disp-1 Package, R-2Normal  -     albuterol sulfate HFA (VENTOLIN HFA) 108 (90 Base) MCG/ACT inhaler; inhale 2 puffs by mouth every 6 hours if needed for wheezing, Disp-2 Inhaler, R-1Normal  -     fluticasone (FLOVENT HFA) 110 MCG/ACT inhaler; Inhale 2 puffs into the lungs 2 times daily, Disp-1 Inhaler, R-3Normal    Poorly controlled asthma likely due to running out of Flovent. Normal lung exam today. Advised to resume Flovent twice daily as previously prescribed. Refills provided for albuterol inhaler and nebulizer. No follow-ups on file. No orders of the defined types were placed in this encounter.     Orders Placed This Encounter   Medications    albuterol (PROVENTIL) (2.5 MG/3ML) 0.083% nebulizer solution     Sig: Take 3 mLs by nebulization every 4 hours as needed for Wheezing or Shortness of Breath     Dispense:  1 Package     Refill:  2    albuterol sulfate HFA (VENTOLIN HFA) 108 (90 Base) MCG/ACT inhaler     Sig: inhale 2 puffs by mouth every 6 hours if needed for wheezing     Dispense:  2 Inhaler     Refill:  1    fluticasone (FLOVENT HFA) 110 MCG/ACT inhaler     Sig: Inhale 2 puffs into the lungs 2 times daily     Dispense:  1 Inhaler     Refill:  3       Patient given educational materials - see patient instructions. Discussed use, benefit, and side effects of prescribed medications. All patientquestions answered. Pt voiced understanding. Reviewed health maintenance. Instructedto continue current medications, diet and exercise. Patient agreed with treatmentplan. Follow up as directed.      Electronically signed by Pal Miller MD on 8/16/2021 at 5:23 PM

## 2021-09-13 ENCOUNTER — TELEMEDICINE (OUTPATIENT)
Dept: FAMILY MEDICINE CLINIC | Age: 51
End: 2021-09-13
Payer: COMMERCIAL

## 2021-09-13 DIAGNOSIS — H10.32 ACUTE BACTERIAL CONJUNCTIVITIS OF LEFT EYE: Primary | ICD-10-CM

## 2021-09-13 PROCEDURE — 99213 OFFICE O/P EST LOW 20 MIN: CPT | Performed by: STUDENT IN AN ORGANIZED HEALTH CARE EDUCATION/TRAINING PROGRAM

## 2021-09-13 RX ORDER — BACITRACIN ZINC AND POLYMYXIN B SULFATE 500; 10000 [USP'U]/G; [USP'U]/G
0.5 OINTMENT OPHTHALMIC 2 TIMES DAILY
Qty: 3.5 G | Refills: 0 | Status: SHIPPED | OUTPATIENT
Start: 2021-09-13 | End: 2021-09-23

## 2021-09-13 ASSESSMENT — ENCOUNTER SYMPTOMS
EYE REDNESS: 1
CONSTIPATION: 0
VOMITING: 0
EYE PAIN: 1
WHEEZING: 0
DIARRHEA: 0
SORE THROAT: 0
ABDOMINAL PAIN: 0
CHEST TIGHTNESS: 0
COUGH: 0
SHORTNESS OF BREATH: 0
NAUSEA: 0
EYE DISCHARGE: 1

## 2021-09-13 NOTE — PROGRESS NOTES
2021    TELEHEALTH EVALUATION -- Audio/Visual (During [id number]-62 public health emergency)    HPI:    Bailey Sandoval (:  1970) has requested an audio/video evaluation for the following concern(s):    He is complaining of left eye pain and redness for past few days. Has been having thick discharge from the eyes especially when he wakes up in the morning. Says that the eye gets heavy and painful by end of the day and feels swollen. Denies any photophobia or vision disturbance. Denies any fevers, chills, headache or nausea. Review of Systems   Constitutional: Negative for appetite change, fatigue and fever. HENT: Negative for congestion, ear pain, hearing loss and sore throat. Eyes: Positive for pain, discharge and redness. Negative for visual disturbance. Respiratory: Negative for cough, chest tightness, shortness of breath and wheezing. Cardiovascular: Negative for chest pain, palpitations and leg swelling. Gastrointestinal: Negative for abdominal pain, constipation, diarrhea, nausea and vomiting. Genitourinary: Negative for flank pain, frequency, hematuria and urgency. Musculoskeletal: Negative for arthralgias, gait problem, joint swelling and myalgias. Skin: Negative. Neurological: Negative for dizziness, weakness, numbness and headaches. Psychiatric/Behavioral: Negative. Negative for dysphoric mood. The patient is not nervous/anxious. Prior to Visit Medications    Medication Sig Taking? Authorizing Provider   bacitracin-polymyxin b (POLYSPORIN) [phone] UNIT/GM ophthalmic ointment Place 0.5 inches into the left eye 2 times daily for 10 days Every 12 hours.  Yes Tre Cain MD   albuterol (PROVENTIL) (2.5 MG/3ML) 0.083% nebulizer solution Take 3 mLs by nebulization every 4 hours as needed for Wheezing or Shortness of Breath Yes Tre Cain MD   albuterol sulfate HFA (VENTOLIN HFA) 108 (90 Base) MCG/ACT inhaler inhale 2 puffs by mouth every 6 hours if needed for wheezing Yes Mimi Hameed MD   fluticasone (FLOVENT HFA) 110 MCG/ACT inhaler Inhale 2 puffs into the lungs 2 times daily Yes Mimi Hameed MD   aspirin 325 MG tablet aspirin 325 mg tablet   take 1 tablet by mouth every 12 hours Yes Historical Provider, MD   cyclobenzaprine (FLEXERIL) 5 MG tablet cyclobenzaprine 5 mg tablet   take 1 tablet by mouth twice a day if needed for muscle spasm Yes Historical Provider, MD   folic acid (FOLVITE) 1 MG tablet take 1 tablet by mouth once daily Yes Historical Provider, MD   fluticasone (FLONASE) 50 MCG/ACT nasal spray 1 spray by Nasal route daily Yes Lia Odell MD   fexofenadine (ALLEGRA) 180 MG tablet Take 1 tablet by mouth daily Yes Lia Odell MD   diclofenac sodium (VOLTAREN) 1 % GEL diclofenac 1 % topical gel Yes Historical Provider, MD   propranolol (INDERAL) 10 MG tablet Take 10 mg by mouth 2 times daily Yes Historical Provider, MD   XIFAXAN 550 MG tablet  Yes Historical Provider, MD   meloxicam (MOBIC) 15 MG tablet  Yes Historical Provider, MD   Multiple Vitamins-Minerals (MULTIVITAMIN ADULT PO) Take 1 tablet by mouth Yes Historical Provider, MD   azaTHIOprine (IMURAN) 50 MG tablet Take 50 mg by mouth 3 times daily  Yes Historical Provider, MD       Social History     Tobacco Use    Smoking status: Never Smoker    Smokeless tobacco: Never Used   Vaping Use    Vaping Use: Never used   Substance Use Topics    Alcohol use: No    Drug use: Never        Allergies   Allergen Reactions    Acetaminophen      Patient has an autoimmune disease and was told that he should not take this medication    ,   Past Medical History:   Diagnosis Date    Alcoholism /alcohol abuse 8/21/2014    Allergic rhinitis, cause unspecified 8/7/2014    [facility]/21/2014    End-stage [facility] disease ([facility] 8/21/2014    Esophageal reflux 8/7/2014    Essential hypertension, benign 8/7/2014    Portal hypertension with esophageal varices ([facility] 8/21/2014   ,   Past Surgical History:   Procedure Laterality Date    CHALAZION EXCISION Right 08/2017    UPPER GASTROINTESTINAL ENDOSCOPY  12/13/2016   ,   Social History     Tobacco Use    Smoking status: Never Smoker    Smokeless tobacco: Never Used   Vaping Use    Vaping Use: Never used   Substance Use Topics    Alcohol use: No    Drug use: Never   ,   Family History   Problem Relation Age of Onset    Hypertension Mother    ,   Immunization History   Administered Date(s) Administered    BCG (Fly BCG) 01/01/2002    COVID-19, Pfizer, PF, 30mcg/0.3mL 02/27/2021, 03/20/2021    Hepatitis A Adult (Vaqta) 09/06/2017, 03/09/2018    Hepatitis B 09/06/2017, 10/05/2017, 03/09/2018, 11/08/2018, 01/17/2019, 06/13/2019    Hepatitis B Adult (Engerix-B) 10/05/2017, 03/09/2018, 11/08/2018, 01/17/2019, 06/13/2019    Hepatitis B Adult (Recombivax HB) 09/06/2017    Influenza Virus Vaccine 12/10/2015    Influenza, Yolis Half, IM, PF (6 mo and older Fluzone, Flulaval, Fluarix, and 3 yrs and older Afluria) 09/06/2017, 11/08/2018, 11/06/2019, 09/29/2020    PPD Test 01/01/2000    Pneumococcal Polysaccharide (Gljeecpcc75) 10/05/2017    Tdap (Boostrix, Adacel) 10/05/2017    Tetanus 07/19/2004    Tetanus Toxoid, absorbed 07/19/2004    Zoster Recombinant (Shingrix) 08/05/2017   ,   Health Maintenance   Topic Date Due    HIV screen  Never done    Pneumococcal 0-64 years Vaccine (2 of 4 - PCV13) 10/05/2018    Shingles Vaccine (2 of 2) 11/30/2018    COVID-19 Vaccine (3 - Pfizer risk 3-dose series) 04/17/2021    Flu vaccine (1) 09/01/2021    Diabetes screen  06/11/2024    Lipid screen  06/11/2026    DTaP/Tdap/Td vaccine (2 - Td or Tdap) 10/05/2027    Colon cancer screen colonoscopy  04/10/2029    Hepatitis A vaccine  Completed    Hepatitis B vaccine  Completed    Hepatitis C screen  Completed    Hib vaccine  Aged Out    Meningococcal (ACWY) vaccine  Aged Out       PHYSICAL EXAMINATION:  [ INSTRUCTIONS:  \"[x]\" Indicates a positive within the past 12 months. The visit was conducted pursuant to the emergency declaration under the [address] [place] [address] and the [email or web address] Act. Patient identification was verified, and a caregiver was present when appropriate. The patient was located in a state where the provider was credentialed to provide care. Total time spent on this encounter: Not billed by time    --Gale Skaggs MD on 9/13/2021 at 4:58 PM    An electronic signature was used to authenticate this note.

## 2021-11-02 ENCOUNTER — PATIENT MESSAGE (OUTPATIENT)
Dept: FAMILY MEDICINE CLINIC | Age: 51
End: 2021-11-02

## 2021-11-03 RX ORDER — MELOXICAM 15 MG/1
15 TABLET ORAL DAILY
Qty: 30 TABLET | Refills: 3 | Status: SHIPPED | OUTPATIENT
Start: 2021-11-03 | End: 2022-02-24 | Stop reason: SDUPTHER

## 2021-11-03 NOTE — TELEPHONE ENCOUNTER
From: Mehrdad Walls  To: Senthil Key MD  Sent: 11/2/2021 7:28 PM EDT  Subject: Prescription Question    I am need of refill for my Meloxicam 15g for my arthritis. I have been out for some time now. The pharmacy reports sending in a request. Can you please refill them.  Thanks   Abdi Neumann

## 2021-12-03 ENCOUNTER — TELEPHONE (OUTPATIENT)
Dept: FAMILY MEDICINE CLINIC | Age: 51
End: 2021-12-03

## 2021-12-03 DIAGNOSIS — J34.89 NASAL DRAINAGE: ICD-10-CM

## 2021-12-03 DIAGNOSIS — J34.89 SINUS PRESSURE: Primary | ICD-10-CM

## 2021-12-03 RX ORDER — DOXYCYCLINE HYCLATE 100 MG
100 TABLET ORAL 2 TIMES DAILY
Qty: 14 TABLET | Refills: 0 | Status: SHIPPED | OUTPATIENT
Start: 2021-12-03 | End: 2021-12-08 | Stop reason: SDUPTHER

## 2021-12-03 NOTE — TELEPHONE ENCOUNTER
Patient called and stated that he has had a Cough Congestion Green mucus from nose, for the last 3 days. Stated he took a Covid test on yesterday which came back  was negative. He is wondering if he could get an antibiotic sent to the pharmacy, to help with his symptoms.   please advise thanks

## 2021-12-08 DIAGNOSIS — L70.0 ACNE VULGARIS: Primary | ICD-10-CM

## 2021-12-08 DIAGNOSIS — J34.89 SINUS PRESSURE: ICD-10-CM

## 2021-12-08 RX ORDER — DOXYCYCLINE HYCLATE 100 MG
100 TABLET ORAL DAILY
Qty: 30 TABLET | Refills: 0 | Status: SHIPPED | OUTPATIENT
Start: 2021-12-08 | End: 2022-01-07

## 2022-01-20 ENCOUNTER — OFFICE VISIT (OUTPATIENT)
Dept: FAMILY MEDICINE CLINIC | Age: 52
End: 2022-01-20
Payer: COMMERCIAL

## 2022-01-20 VITALS
BODY MASS INDEX: 42.66 KG/M2 | WEIGHT: 315 LBS | DIASTOLIC BLOOD PRESSURE: 75 MMHG | TEMPERATURE: 97.4 F | SYSTOLIC BLOOD PRESSURE: 111 MMHG | HEIGHT: 72 IN

## 2022-01-20 DIAGNOSIS — L70.0 ACNE VULGARIS: ICD-10-CM

## 2022-01-20 DIAGNOSIS — J45.20 MILD INTERMITTENT ASTHMA WITHOUT COMPLICATION: ICD-10-CM

## 2022-01-20 DIAGNOSIS — M17.0 BILATERAL PRIMARY OSTEOARTHRITIS OF KNEE: ICD-10-CM

## 2022-01-20 DIAGNOSIS — K72.10 END-STAGE LIVER DISEASE (HCC): Primary | ICD-10-CM

## 2022-01-20 PROCEDURE — 1036F TOBACCO NON-USER: CPT | Performed by: STUDENT IN AN ORGANIZED HEALTH CARE EDUCATION/TRAINING PROGRAM

## 2022-01-20 PROCEDURE — G8417 CALC BMI ABV UP PARAM F/U: HCPCS | Performed by: STUDENT IN AN ORGANIZED HEALTH CARE EDUCATION/TRAINING PROGRAM

## 2022-01-20 PROCEDURE — 3017F COLORECTAL CA SCREEN DOC REV: CPT | Performed by: STUDENT IN AN ORGANIZED HEALTH CARE EDUCATION/TRAINING PROGRAM

## 2022-01-20 PROCEDURE — G8427 DOCREV CUR MEDS BY ELIG CLIN: HCPCS | Performed by: STUDENT IN AN ORGANIZED HEALTH CARE EDUCATION/TRAINING PROGRAM

## 2022-01-20 PROCEDURE — 99214 OFFICE O/P EST MOD 30 MIN: CPT | Performed by: STUDENT IN AN ORGANIZED HEALTH CARE EDUCATION/TRAINING PROGRAM

## 2022-01-20 PROCEDURE — G8484 FLU IMMUNIZE NO ADMIN: HCPCS | Performed by: STUDENT IN AN ORGANIZED HEALTH CARE EDUCATION/TRAINING PROGRAM

## 2022-01-20 RX ORDER — FLUTICASONE PROPIONATE 110 UG/1
2 AEROSOL, METERED RESPIRATORY (INHALATION) 2 TIMES DAILY
Qty: 2 EACH | Refills: 0 | Status: SHIPPED | OUTPATIENT
Start: 2022-01-20 | End: 2022-07-19 | Stop reason: SDUPTHER

## 2022-01-20 RX ORDER — CLINDAMYCIN AND BENZOYL PEROXIDE 10; 50 MG/G; MG/G
GEL TOPICAL
Qty: 50 G | Refills: 1 | Status: SHIPPED | OUTPATIENT
Start: 2022-01-20

## 2022-01-20 ASSESSMENT — ENCOUNTER SYMPTOMS
CHEST TIGHTNESS: 0
CONSTIPATION: 0
COUGH: 0
DIARRHEA: 0
VOMITING: 0
WHEEZING: 0
ABDOMINAL PAIN: 0
EYE DISCHARGE: 0
NAUSEA: 0
SORE THROAT: 0
SHORTNESS OF BREATH: 0

## 2022-01-20 NOTE — PROGRESS NOTES
[address] PRIMARY CARE  [address] [facility] [address]  Dept: [phone]  Dept Fax: [phone]    Torrie Hugo is a 46 y.o. male who is a Established patient, who presents today for his medical conditions/complaints as noted below:  Chief Complaint   Patient presents with   Aleksandra Saab Established New Doctor    Other     handicap placard    Other     acne breakout wants to know if needs to continue penicillion or needs a face cream     Other     bumps all of body sometimes, sometimes they itch, they stay but sometimes gets inflammed          HPI:     He is here today to meet new provider and to follow-up on his chronic conditions. He has liver cirrhosis for which he follows with gastroenterology. He is requesting a new handicap placard. He also has facial acne for which he was previously on doxycycline and completed the course. His face is cleared up now and he is wondering if he needs to continue the medication. He is also requesting refills on Voltaren gel and albuterol inhaler. He also complains of skin lumps which feel like a knot in his earlobes and also in his inner thigh area. Says that they are not painful but occasionally itch. He has noticed them over past few months.       Hemoglobin A1C (%)   Date Value   06/11/2021 5.3   11/30/2020 5.4   08/05/2019 5.4             ( goal A1Cis < 7)   No results found for: LABMICR  LDL Calculated (mg/dL)   Date Value   06/11/2021 53   08/05/2019 59   05/24/2017 59       (goal LDL is <100)   AST (U/L)   Date Value   06/11/2021 26     ALT (U/L)   Date Value   06/11/2021 13     BUN (mg/dL)   Date Value   06/11/2021 14     BP Readings from Last 3 Encounters:   01/20/22 111/75   08/16/21 115/79   05/19/21 119/82          (goal 120/80)    Past Medical History:   Diagnosis Date    Alcoholism /alcohol abuse 8/21/2014    Allergic rhinitis, cause unspecified 8/7/2014    Cirrhosis with alcoholism ([address] 8/21/2014    End-stage liver disease ([facility] 8/21/2014    Esophageal reflux 8/7/2014    Essential hypertension, benign 8/7/2014    Portal hypertension with esophageal varices ([facility] 8/21/2014      Past Surgical History:   Procedure Laterality Date    CHALAZION EXCISION Right 08/2017    UPPER GASTROINTESTINAL ENDOSCOPY  12/13/2016       Family History   Problem Relation Age of Onset    Hypertension Mother        Social History     Tobacco Use    Smoking status: Never Smoker    Smokeless tobacco: Never Used   Substance Use Topics    Alcohol use: No      Current Outpatient Medications   Medication Sig Dispense Refill    diclofenac sodium (VOLTAREN) 1 % GEL diclofenac 1 % topical gel 100 g 1    fluticasone (FLOVENT HFA) 110 MCG/ACT inhaler Inhale 2 puffs into the lungs 2 times daily 2 each 0    clindamycin-benzoyl peroxide (BENZACLIN) 1-5 % gel Apply topically 2 times daily.  50 g 1    Handicap Placard MISC by Does not apply route Exp 1/2027 1 each 0    meloxicam (MOBIC) 15 MG tablet Take 1 tablet by mouth daily 30 tablet 3    albuterol sulfate HFA (VENTOLIN HFA) 108 (90 Base) MCG/ACT inhaler inhale 2 puffs by mouth every 6 hours if needed for wheezing 2 Inhaler 1    cyclobenzaprine (FLEXERIL) 5 MG tablet cyclobenzaprine 5 mg tablet   take 1 tablet by mouth twice a day if needed for muscle spasm      folic acid (FOLVITE) 1 MG tablet take 1 tablet by mouth once daily      fluticasone (FLONASE) 50 MCG/ACT nasal spray 1 spray by Nasal route daily 1 Bottle 3    fexofenadine (ALLEGRA) 180 MG tablet Take 1 tablet by mouth daily 30 tablet 5    propranolol (INDERAL) 10 MG tablet Take 10 mg by mouth 2 times daily      Multiple Vitamins-Minerals (MULTIVITAMIN ADULT PO) Take 1 tablet by mouth      azaTHIOprine (IMURAN) 50 MG tablet Take 50 mg by mouth 3 times daily   0    albuterol (PROVENTIL) (2.5 MG/3ML) 0.083% nebulizer solution Take 3 mLs by nebulization every 4 hours as needed for Wheezing or Shortness of Breath 1 Package 2    [employer] 550 MG tablet  (Patient not taking: Reported on 1/20/2022)  0     No current facility-administered medications for this visit. Allergies   Allergen Reactions    Acetaminophen      Patient has an autoimmune disease and was told that he should not take this medication        Health Maintenance   Topic Date Due    HIV screen  Never done    Shingles Vaccine (2 of 2) 11/30/2018    Depression Screen  02/01/2022    COVID-19 Vaccine (4 - Booster for Pfizer series) 04/25/2022    Pneumococcal 0-64 years Vaccine (3 of 4 - PCV13) 10/25/2022    Diabetes screen  06/11/2024    Lipid screen  06/11/2026    DTaP/Tdap/Td vaccine (2 - Td or Tdap) 10/05/2027    Colon cancer screen colonoscopy  04/10/2029    Hepatitis A vaccine  Completed    Hepatitis B vaccine  Completed    Flu vaccine  Completed    Hepatitis C screen  Completed    Hib vaccine  Aged Out    Meningococcal (ACWY) vaccine  Aged Out       Subjective:     Review of Systems   Constitutional: Negative for appetite change, fatigue and fever. HENT: Negative for congestion, ear pain, hearing loss and sore throat. Eyes: Negative for discharge and visual disturbance. Respiratory: Negative for cough, chest tightness, shortness of breath and wheezing. Cardiovascular: Negative for chest pain, palpitations and leg swelling. Gastrointestinal: Negative for abdominal pain, constipation, diarrhea, nausea and vomiting. Genitourinary: Negative for flank pain, frequency, hematuria and urgency. Musculoskeletal: Positive for arthralgias. Negative for gait problem, joint swelling and myalgias. Skin: Negative. Neurological: Negative for dizziness, weakness, numbness and headaches. Psychiatric/Behavioral: Negative. Negative for dysphoric mood. The patient is not nervous/anxious. Objective:     Physical Exam  Vitals reviewed. Constitutional:       Appearance: Normal appearance. He is normal weight. HENT:      Head: Normocephalic and atraumatic. Left Ear: Tympanic membrane normal.      Nose: Nose normal.      Mouth/Throat:      Mouth: Mucous membranes are moist.      Pharynx: Oropharynx is clear. Eyes:      Extraocular Movements: Extraocular movements intact. Conjunctiva/sclera: Conjunctivae normal.      Pupils: Pupils are equal, round, and reactive to light. Cardiovascular:      Rate and Rhythm: Normal rate and regular rhythm. Heart sounds: Normal heart sounds. No murmur heard. No gallop. Pulmonary:      Effort: Pulmonary effort is normal. No respiratory distress. Breath sounds: Normal breath sounds. No stridor. No wheezing. Abdominal:      General: Bowel sounds are normal. There is no distension. Palpations: Abdomen is soft. Tenderness: There is no abdominal tenderness. There is no guarding or rebound. Musculoskeletal:         General: No swelling or tenderness. Normal range of motion. Cervical back: Normal range of motion and neck supple. Skin:     General: Skin is warm and dry. Coloration: Skin is not jaundiced. Findings: No rash. Comments: Few comedonal acne on lower face  Tiny firm lump felt in both earlobes   Neurological:      General: No focal deficit present. Mental Status: He is alert and oriented to person, place, and time. Psychiatric:         Mood and Affect: Mood normal.         Behavior: Behavior normal.         Thought Content: Thought content normal.         Judgment: Judgment normal.       /75 (Site: Right Upper Arm, Position: Sitting, Cuff Size: Large Adult)   Temp 97.4 °F (36.3 °C) (Temporal)   Ht 6' (1.829 m)   Wt (!) 328 lb (148.8 kg)   BMI 44.48 kg/m²     Assessment/Plan:   1. [facility] ([facility]  -     Handicap placard  -     Handicap Placard MISC; Starting Thu 1/20/2022, Disp-1 each, R-0, PrintExp 1/2027  2. Mild intermittent asthma without complication  -     fluticasone (FLOVENT HFA) 110 MCG/ACT inhaler;  Inhale 2 puffs into the lungs 2 times daily, Disp-2 each, R-0Normal  3. Acne vulgaris  -     clindamycin-benzoyl peroxide (BENZACLIN) 1-5 % gel; Apply topically 2 times daily. , Disp-50 g, R-1, Normal  4. Bilateral primary osteoarthritis of knee  -     diclofenac sodium (VOLTAREN) 1 % GEL; diclofenac 1 % topical gel, Disp-100 g, R-1, Normal    Liver cirrhosis-following with gastroenterology    Asthma-well-controlled. Continue Flovent and use albuterol as needed    Acne-advised to use topical gel as needed. Discussed that the skin lumps felt are probably benign lipomas, advised to avoid scratching. Return in about 6 months (around 7/20/2022). Orders Placed This Encounter   Procedures    Handicap placard     Orders Placed This Encounter   Medications    diclofenac sodium (VOLTAREN) 1 % GEL     Sig: diclofenac 1 % topical gel     Dispense:  100 g     Refill:  1    fluticasone (FLOVENT HFA) 110 MCG/ACT inhaler     Sig: Inhale 2 puffs into the lungs 2 times daily     Dispense:  2 each     Refill:  0    clindamycin-benzoyl peroxide (BENZACLIN) 1-5 % gel     Sig: Apply topically 2 times daily. Dispense:  50 g     Refill:  1    Handicap Placard MISC     Sig: by Does not apply route Exp 1/2027     Dispense:  1 each     Refill:  0       Patient given educational materials - see patient instructions. Discussed use, benefit, and side effects of prescribed medications. All patientquestions answered. Pt voiced understanding. Reviewed health maintenance. Instructedto continue current medications, diet and exercise. Patient agreed with treatmentplan. Follow up as directed.      Electronically signed by Derek Jaramillo MD on 1/20/2022 at 2:36 PM

## 2022-01-25 DIAGNOSIS — M62.830 BACK MUSCLE SPASM: Primary | ICD-10-CM

## 2022-01-25 RX ORDER — CYCLOBENZAPRINE HCL 5 MG
TABLET ORAL
Qty: 20 TABLET | Refills: 0 | Status: SHIPPED | OUTPATIENT
Start: 2022-01-25

## 2022-02-24 RX ORDER — MELOXICAM 15 MG/1
15 TABLET ORAL DAILY
Qty: 30 TABLET | Refills: 3 | Status: SHIPPED | OUTPATIENT
Start: 2022-02-24 | End: 2022-08-25

## 2022-02-24 NOTE — TELEPHONE ENCOUNTER
Wing Grimaldo is calling to request a refill on the following medication(s):    Medication Request:  Requested Prescriptions     Pending Prescriptions Disp Refills    meloxicam (MOBIC) 15 MG tablet 30 tablet 3     Sig: Take 1 tablet by mouth daily       Last Visit Date (If Applicable):  1/20/5063    Next Visit Date:    Visit date not found

## 2022-06-05 ASSESSMENT — PATIENT HEALTH QUESTIONNAIRE - PHQ9
1. LITTLE INTEREST OR PLEASURE IN DOING THINGS: 0
SUM OF ALL RESPONSES TO PHQ QUESTIONS 1-9: 0
2. FEELING DOWN, DEPRESSED OR HOPELESS: 0
SUM OF ALL RESPONSES TO PHQ QUESTIONS 1-9: 0
SUM OF ALL RESPONSES TO PHQ QUESTIONS 1-9: 0
SUM OF ALL RESPONSES TO PHQ9 QUESTIONS 1 & 2: 0
SUM OF ALL RESPONSES TO PHQ QUESTIONS 1-9: 0

## 2022-07-13 RX ORDER — BACITRACIN ZINC AND POLYMYXIN B SULFATE 500; 10000 [USP'U]/G; [USP'U]/G
OINTMENT OPHTHALMIC
Qty: 3.5 G | OUTPATIENT
Start: 2022-07-13

## 2022-07-14 DIAGNOSIS — H11.9 CONJUNCTIVA DISORDER: Primary | ICD-10-CM

## 2022-07-14 RX ORDER — BACITRACIN ZINC AND POLYMYXIN B SULFATE 500; 10000 [USP'U]/G; [USP'U]/G
0.5 OINTMENT OPHTHALMIC 2 TIMES DAILY
Qty: 3.5 G | Refills: 0 | Status: SHIPPED | OUTPATIENT
Start: 2022-07-14 | End: 2022-07-19

## 2022-07-14 NOTE — TELEPHONE ENCOUNTER
Patient was seen at the eye specialist on 7 13 2022 for a few issues including dry eye.  He stated that he has discharge from the eye he has a follow up appt  with the eye DrEster next Friday July 22

## 2022-07-19 ENCOUNTER — OFFICE VISIT (OUTPATIENT)
Dept: FAMILY MEDICINE CLINIC | Age: 52
End: 2022-07-19
Payer: COMMERCIAL

## 2022-07-19 VITALS
WEIGHT: 315 LBS | TEMPERATURE: 97.2 F | DIASTOLIC BLOOD PRESSURE: 83 MMHG | SYSTOLIC BLOOD PRESSURE: 125 MMHG | HEART RATE: 60 BPM | HEIGHT: 71 IN | BODY MASS INDEX: 44.1 KG/M2 | OXYGEN SATURATION: 99 %

## 2022-07-19 DIAGNOSIS — Z12.5 SCREENING FOR PROSTATE CANCER: ICD-10-CM

## 2022-07-19 DIAGNOSIS — J45.20 MILD INTERMITTENT ASTHMA WITHOUT COMPLICATION: Primary | ICD-10-CM

## 2022-07-19 PROCEDURE — 1036F TOBACCO NON-USER: CPT | Performed by: STUDENT IN AN ORGANIZED HEALTH CARE EDUCATION/TRAINING PROGRAM

## 2022-07-19 PROCEDURE — 99214 OFFICE O/P EST MOD 30 MIN: CPT | Performed by: STUDENT IN AN ORGANIZED HEALTH CARE EDUCATION/TRAINING PROGRAM

## 2022-07-19 PROCEDURE — G8427 DOCREV CUR MEDS BY ELIG CLIN: HCPCS | Performed by: STUDENT IN AN ORGANIZED HEALTH CARE EDUCATION/TRAINING PROGRAM

## 2022-07-19 PROCEDURE — G8417 CALC BMI ABV UP PARAM F/U: HCPCS | Performed by: STUDENT IN AN ORGANIZED HEALTH CARE EDUCATION/TRAINING PROGRAM

## 2022-07-19 PROCEDURE — 3017F COLORECTAL CA SCREEN DOC REV: CPT | Performed by: STUDENT IN AN ORGANIZED HEALTH CARE EDUCATION/TRAINING PROGRAM

## 2022-07-19 RX ORDER — PREDNISOLONE ACETATE 10 MG/ML
SUSPENSION/ DROPS OPHTHALMIC
COMMUNITY
Start: 2022-07-13

## 2022-07-19 RX ORDER — IBUPROFEN 800 MG/1
TABLET ORAL
COMMUNITY
Start: 2022-04-29

## 2022-07-19 RX ORDER — FLUTICASONE PROPIONATE 110 UG/1
2 AEROSOL, METERED RESPIRATORY (INHALATION) 2 TIMES DAILY
Qty: 2 EACH | Refills: 0 | Status: SHIPPED | OUTPATIENT
Start: 2022-07-19 | End: 2023-07-19

## 2022-07-19 SDOH — ECONOMIC STABILITY: FOOD INSECURITY: WITHIN THE PAST 12 MONTHS, THE FOOD YOU BOUGHT JUST DIDN'T LAST AND YOU DIDN'T HAVE MONEY TO GET MORE.: NEVER TRUE

## 2022-07-19 SDOH — ECONOMIC STABILITY: FOOD INSECURITY: WITHIN THE PAST 12 MONTHS, YOU WORRIED THAT YOUR FOOD WOULD RUN OUT BEFORE YOU GOT MONEY TO BUY MORE.: NEVER TRUE

## 2022-07-19 ASSESSMENT — ENCOUNTER SYMPTOMS
SHORTNESS OF BREATH: 0
NAUSEA: 0
VOMITING: 0
DIARRHEA: 0
ABDOMINAL PAIN: 0
COUGH: 0
EYE DISCHARGE: 0
CHEST TIGHTNESS: 0
SORE THROAT: 0
WHEEZING: 0
CONSTIPATION: 0

## 2022-07-19 ASSESSMENT — SOCIAL DETERMINANTS OF HEALTH (SDOH): HOW HARD IS IT FOR YOU TO PAY FOR THE VERY BASICS LIKE FOOD, HOUSING, MEDICAL CARE, AND HEATING?: NOT HARD AT ALL

## 2022-07-19 NOTE — PROGRESS NOTES
[address] PRIMARY CARE  [address] [place] [address]  Dept: [phone]  Dept Fax: [phone]    Rocky Becker is a 46 y.o. male who is a Established patient, who presents today for his medical conditions/complaints as noted below:  Chief Complaint   Patient presents with    6 Month Follow-Up    Asthma    Eye Injury         HPI:     He is here today for 6-month follow-up. He says that he has been doing well overall and follows with gastroenterology regularly for his liver cirrhosis. His asthma was well controlled until he got COVID few months ago and since then he has been back on Flovent which has been helping a lot. He says he has been taking meloxicam every day for his knee pain and that has been helping. He has been following with ophthalmology for his eye issues. He says that he gets frequent dry eyes and itching for which he was put on steroid eyedrops that he has been taking. He is due for his annual labs.             Hemoglobin A1C (%)   Date Value   06/11/2021 5.3   11/30/2020 5.4   08/05/2019 5.4             ( goal A1Cis < 7)   No results found for: LABMICR  LDL Calculated (mg/dL)   Date Value   06/11/2021 53   08/05/2019 59   05/24/2017 59       (goal LDL is <100)   AST (U/L)   Date Value   06/11/2021 26     ALT (U/L)   Date Value   06/11/2021 13     BUN (mg/dL)   Date Value   06/11/2021 14     BP Readings from Last 3 Encounters:   07/19/22 125/83   01/20/22 111/75   08/16/21 115/79          (goal 120/80)    Past Medical History:   Diagnosis Date    Alcoholism /alcohol abuse 8/21/2014    Allergic rhinitis, cause unspecified 8/7/2014    Cirrhosis with alcoholism ([address] 8/21/2014    End-stage liver disease ([address] 8/21/2014    Esophageal reflux 8/7/2014    Essential hypertension, benign 8/7/2014    Portal hypertension with esophageal varices ([address] 8/21/2014      Past Surgical History:   Procedure Laterality Date    CHALAZION EXCISION Right 08/2017    UPPER GASTROINTESTINAL ENDOSCOPY  12/13/2016       Family History   Problem Relation Age of Onset    Hypertension Mother        Social History     Tobacco Use    Smoking status: Never    Smokeless tobacco: Never   Substance Use Topics    Alcohol use: No      Current Outpatient Medications   Medication Sig Dispense Refill    prednisoLONE acetate (PRED FORTE) 1 % ophthalmic suspension prednisolone acetate 1 % eye drops,suspension      ibuprofen (ADVIL;MOTRIN) 800 MG tablet take 1 tablet by mouth every 8 hours if needed      fluticasone (FLOVENT HFA) 110 MCG/ACT inhaler Inhale 2 puffs into the lungs in the morning and 2 puffs before bedtime. 2 each 0    meloxicam (MOBIC) 15 MG tablet Take 1 tablet by mouth daily 30 tablet 3    cyclobenzaprine (FLEXERIL) 5 MG tablet take 1 tablet by mouth twice a day if needed for muscle spasm 20 tablet 0    diclofenac sodium (VOLTAREN) 1 % GEL diclofenac 1 % topical gel 100 g 1    clindamycin-benzoyl peroxide (BENZACLIN) 1-5 % gel Apply topically 2 times daily. 50 g 1    Handicap Placard MISC by Does not apply route Exp 1/2027 1 each 0    albuterol (PROVENTIL) (2.5 MG/3ML) 0.083% nebulizer solution Take 3 mLs by nebulization every 4 hours as needed for Wheezing or Shortness of Breath 1 Package 2    albuterol sulfate HFA (VENTOLIN HFA) 108 (90 Base) MCG/ACT inhaler inhale 2 puffs by mouth every 6 hours if needed for wheezing 2 Inhaler 1    folic acid (FOLVITE) 1 MG tablet take 1 tablet by mouth once daily      fluticasone (FLONASE) 50 MCG/ACT nasal spray 1 spray by Nasal route daily 1 Bottle 3    fexofenadine (ALLEGRA) 180 MG tablet Take 1 tablet by mouth daily 30 tablet 5    propranolol (INDERAL) 10 MG tablet Take 10 mg by mouth 2 times daily      Multiple Vitamins-Minerals (MULTIVITAMIN ADULT PO) Take 1 tablet by mouth      azaTHIOprine (IMURAN) 50 MG tablet Take 50 mg by mouth 3 times daily   0     No current facility-administered medications for this visit.      Allergies   Allergen Reactions    Acetaminophen Other (See Comments)     Patient has an autoimmune disease and was told that he should not take this medication   Patient has an autoimmune disease and was told that he should not take this medication        Health Maintenance   Topic Date Due    HIV screen  Never done    Shingles vaccine (2 of 2) 09/30/2017    COVID-19 Vaccine (4 - Booster for Pfizer series) 01/25/2022    Flu vaccine (1) 09/01/2022    Pneumococcal 0-64 years Vaccine (3 - PCV) 10/25/2022    Depression Screen  06/05/2023    Diabetes screen  06/11/2024    Lipids  06/11/2026    DTaP/Tdap/Td vaccine (2 - Td or Tdap) 10/05/2027    Colorectal Cancer Screen  04/10/2029    Hepatitis A vaccine  Completed    Hepatitis B vaccine  Completed    Hepatitis C screen  Completed    Hib vaccine  Aged Out    Meningococcal (ACWY) vaccine  Aged Out       Subjective:     Review of Systems   Constitutional:  Negative for appetite change, fatigue and fever. HENT:  Negative for congestion, ear pain, hearing loss and sore throat. Eyes:  Negative for discharge and visual disturbance. Respiratory:  Negative for cough, chest tightness, shortness of breath and wheezing. Cardiovascular:  Negative for chest pain, palpitations and leg swelling. Gastrointestinal:  Negative for abdominal pain, constipation, diarrhea, nausea and vomiting. Genitourinary:  Negative for flank pain, frequency, hematuria and urgency. Musculoskeletal:  Negative for arthralgias, gait problem, joint swelling and myalgias. Skin: Negative. Neurological:  Negative for dizziness, weakness, numbness and headaches. Psychiatric/Behavioral: Negative. Negative for dysphoric mood. The patient is not nervous/anxious. Objective:     Physical Exam  Vitals reviewed. Constitutional:       Appearance: Normal appearance. He is obese. HENT:      Head: Normocephalic and atraumatic.       Nose: Nose normal.      Mouth/Throat:      Mouth: Mucous membranes are moist. Pharynx: Oropharynx is clear. Eyes:      Extraocular Movements: Extraocular movements intact. Conjunctiva/sclera: Conjunctivae normal.      Pupils: Pupils are equal, round, and reactive to light. Cardiovascular:      Rate and Rhythm: Normal rate and regular rhythm. Heart sounds: Normal heart sounds. No murmur heard. No gallop. Pulmonary:      Effort: Pulmonary effort is normal. No respiratory distress. Breath sounds: Normal breath sounds. No stridor. No wheezing. Abdominal:      General: Bowel sounds are normal. There is no distension. Palpations: Abdomen is soft. Tenderness: There is no abdominal tenderness. There is no guarding or rebound. Musculoskeletal:         General: No swelling or tenderness. Normal range of motion. Cervical back: Normal range of motion and neck supple. Skin:     General: Skin is warm and dry. Coloration: Skin is not jaundiced. Findings: No rash. Neurological:      General: No focal deficit present. Mental Status: He is alert and oriented to person, place, and time. Psychiatric:         Mood and Affect: Mood normal.         Behavior: Behavior normal.         Thought Content: Thought content normal.         Judgment: Judgment normal.     /83   Pulse 60   Temp 97.2 °F (36.2 °C)   Ht 5' 11\" (1.803 m)   Wt (!) 330 lb (149.7 kg)   SpO2 99%   BMI 46.03 kg/m²     Assessment/Plan:   1. Mild intermittent asthma without complication  -     fluticasone (FLOVENT HFA) 110 MCG/ACT inhaler; Inhale 2 puffs into the lungs in the morning and 2 puffs before bedtime. , Disp-2 each, R-0Normal  2. BMI 45.0-49.9, adult (HCC)  -     Lipid, Fasting; Future  -     Vitamin D 25 Hydroxy; Future  -     Hemoglobin A1C; Future  -     Comprehensive Metabolic Panel, Fasting; Future  -     CBC with Auto Differential; Future  3. Screening for prostate cancer  -     PSA Screening;  Future    Asthma-controlled, on daily Flovent and albuterol as needed    Return in about 6 months (around 1/19/2023) for Follow up labs. Orders Placed This Encounter   Procedures    Lipid, Fasting     Standing Status:   Future     Standing Expiration Date:   1/19/2023    PSA Screening     Standing Status:   Future     Standing Expiration Date:   1/19/2023    Vitamin D 25 Hydroxy     Standing Status:   Future     Standing Expiration Date:   1/19/2023    Hemoglobin A1C     Standing Status:   Future     Standing Expiration Date:   1/19/2023    Comprehensive Metabolic Panel, Fasting     Standing Status:   Future     Standing Expiration Date:   1/19/2023    CBC with Auto Differential     Standing Status:   Future     Standing Expiration Date:   1/19/2023     Orders Placed This Encounter   Medications    fluticasone (FLOVENT HFA) 110 MCG/ACT inhaler     Sig: Inhale 2 puffs into the lungs in the morning and 2 puffs before bedtime. Dispense:  2 each     Refill:  0       Patient given educational materials - see patient instructions. Discussed use, benefit, and side effects of prescribed medications. All patientquestions answered. Pt voiced understanding. Reviewed health maintenance. Instructedto continue current medications, diet and exercise. Patient agreed with treatmentplan. Follow up as directed.      Electronically signed by Muriel Jonas MD on 7/19/2022 at 4:56 PM

## 2022-07-28 DIAGNOSIS — J45.20 MILD INTERMITTENT ASTHMA WITHOUT COMPLICATION: Primary | ICD-10-CM

## 2022-08-25 RX ORDER — MELOXICAM 15 MG/1
15 TABLET ORAL DAILY
Qty: 30 TABLET | Refills: 3 | Status: SHIPPED | OUTPATIENT
Start: 2022-08-25

## 2022-08-29 DIAGNOSIS — Z12.5 SCREENING FOR PROSTATE CANCER: ICD-10-CM

## 2022-08-29 LAB
ALBUMIN SERPL-MCNC: 3.5 G/DL
ALP BLD-CCNC: 67 U/L
ALT SERPL-CCNC: 14 U/L
AST SERPL-CCNC: 24 U/L
AVERAGE GLUCOSE: 111
BASOPHILS ABSOLUTE: 0.1 /ΜL
BASOPHILS RELATIVE PERCENT: 1.7 %
BILIRUB SERPL-MCNC: 0.9 MG/DL (ref 0.1–1.4)
BUN BLDV-MCNC: 12 MG/DL
CALCIUM SERPL-MCNC: 9.2 MG/DL
CHLORIDE BLD-SCNC: 106 MMOL/L
CHOLESTEROL, FASTING: 119
CO2: 25 MMOL/L
CREAT SERPL-MCNC: 0.66 MG/DL
EOSINOPHILS ABSOLUTE: 0.2 /ΜL
EOSINOPHILS RELATIVE PERCENT: 4 %
GLUCOSE FASTING: 80 MG/DL
HBA1C MFR BLD: 5.5 %
HCT VFR BLD CALC: 43.9 % (ref 41–53)
HDLC SERPL-MCNC: 47 MG/DL (ref 35–70)
HEMOGLOBIN: 14.9 G/DL (ref 13.5–17.5)
LDL CHOLESTEROL CALCULATED: 63 MG/DL (ref 0–160)
LYMPHOCYTES ABSOLUTE: 1.5 /ΜL
LYMPHOCYTES RELATIVE PERCENT: 37.3 %
MCH RBC QN AUTO: 34 PG
MCHC RBC AUTO-ENTMCNC: 34 G/DL
MCV RBC AUTO: 100 FL
MONOCYTES ABSOLUTE: 0.5 /ΜL
MONOCYTES RELATIVE PERCENT: 11 %
NEUTROPHILS ABSOLUTE: 1.9 /ΜL
NEUTROPHILS RELATIVE PERCENT: 46 %
PDW BLD-RTO: 15.1 %
PLATELET # BLD: 123 K/ΜL
PMV BLD AUTO: 9.8 FL
POTASSIUM SERPL-SCNC: 4 MMOL/L
PROSTATE SPECIFIC ANTIGEN: 0.43 NG/ML
RBC # BLD: 4.39 10^6/ΜL
SODIUM BLD-SCNC: 139 MMOL/L
TOTAL PROTEIN: 7.2 G/DL (ref 6.4–8.2)
TRIGLYCERIDE, FASTING: 47
VITAMIN D 25-HYDROXY: 21.6
VITAMIN D2, 25 HYDROXY: NORMAL
VITAMIN D3,25 HYDROXY: NORMAL
WBC # BLD: 4.1 10^3/ML

## 2022-12-21 ENCOUNTER — HOSPITAL ENCOUNTER (OUTPATIENT)
Dept: GENERAL RADIOLOGY | Age: 52
Discharge: HOME | End: 2022-12-23

## 2022-12-21 ENCOUNTER — HOSPITAL ENCOUNTER (OUTPATIENT)
Age: 52
Discharge: HOME | End: 2022-12-23

## 2022-12-21 DIAGNOSIS — R76.11 PPD POSITIVE: ICD-10-CM

## 2022-12-21 PROCEDURE — 71046 X-RAY EXAM CHEST 2 VIEWS: CPT

## 2023-01-19 ENCOUNTER — OFFICE VISIT (OUTPATIENT)
Dept: FAMILY MEDICINE CLINIC | Age: 53
End: 2023-01-19
Payer: COMMERCIAL

## 2023-01-19 VITALS
SYSTOLIC BLOOD PRESSURE: 126 MMHG | HEART RATE: 68 BPM | OXYGEN SATURATION: 96 % | BODY MASS INDEX: 40.63 KG/M2 | TEMPERATURE: 97.2 F | DIASTOLIC BLOOD PRESSURE: 84 MMHG | HEIGHT: 72 IN | WEIGHT: 300 LBS

## 2023-01-19 DIAGNOSIS — Z13.220 LIPID SCREENING: ICD-10-CM

## 2023-01-19 DIAGNOSIS — Z12.5 SCREENING FOR PROSTATE CANCER: ICD-10-CM

## 2023-01-19 DIAGNOSIS — Z13.1 DIABETES MELLITUS SCREENING: ICD-10-CM

## 2023-01-19 DIAGNOSIS — E55.9 VITAMIN D DEFICIENCY: ICD-10-CM

## 2023-01-19 DIAGNOSIS — Z23 NEED FOR PROPHYLACTIC VACCINATION AND INOCULATION AGAINST VARICELLA: ICD-10-CM

## 2023-01-19 DIAGNOSIS — M17.0 BILATERAL PRIMARY OSTEOARTHRITIS OF KNEE: ICD-10-CM

## 2023-01-19 DIAGNOSIS — J45.20 MILD INTERMITTENT ASTHMA WITHOUT COMPLICATION: Primary | ICD-10-CM

## 2023-01-19 PROCEDURE — G8417 CALC BMI ABV UP PARAM F/U: HCPCS | Performed by: STUDENT IN AN ORGANIZED HEALTH CARE EDUCATION/TRAINING PROGRAM

## 2023-01-19 PROCEDURE — 1036F TOBACCO NON-USER: CPT | Performed by: STUDENT IN AN ORGANIZED HEALTH CARE EDUCATION/TRAINING PROGRAM

## 2023-01-19 PROCEDURE — 3017F COLORECTAL CA SCREEN DOC REV: CPT | Performed by: STUDENT IN AN ORGANIZED HEALTH CARE EDUCATION/TRAINING PROGRAM

## 2023-01-19 PROCEDURE — G8427 DOCREV CUR MEDS BY ELIG CLIN: HCPCS | Performed by: STUDENT IN AN ORGANIZED HEALTH CARE EDUCATION/TRAINING PROGRAM

## 2023-01-19 PROCEDURE — G8482 FLU IMMUNIZE ORDER/ADMIN: HCPCS | Performed by: STUDENT IN AN ORGANIZED HEALTH CARE EDUCATION/TRAINING PROGRAM

## 2023-01-19 PROCEDURE — 99214 OFFICE O/P EST MOD 30 MIN: CPT | Performed by: STUDENT IN AN ORGANIZED HEALTH CARE EDUCATION/TRAINING PROGRAM

## 2023-01-19 RX ORDER — CIPROFLOXACIN HYDROCHLORIDE 3.5 MG/ML
SOLUTION/ DROPS TOPICAL
COMMUNITY

## 2023-01-19 RX ORDER — DOXYCYCLINE HYCLATE 100 MG/1
100 CAPSULE ORAL DAILY
COMMUNITY
Start: 2022-10-28 | End: 2023-01-26

## 2023-01-19 RX ORDER — CHOLECALCIFEROL (VITAMIN D3) 125 MCG
1 CAPSULE ORAL DAILY
Qty: 30 TABLET | Refills: 5 | Status: SHIPPED | OUTPATIENT
Start: 2023-01-19 | End: 2023-02-18

## 2023-01-19 RX ORDER — MELOXICAM 15 MG/1
15 TABLET ORAL DAILY
Qty: 90 TABLET | Refills: 1 | Status: SHIPPED | OUTPATIENT
Start: 2023-01-19

## 2023-01-19 ASSESSMENT — ENCOUNTER SYMPTOMS
COUGH: 0
SORE THROAT: 0
NAUSEA: 0
EYE DISCHARGE: 0
CONSTIPATION: 0
CHEST TIGHTNESS: 0
ABDOMINAL PAIN: 0
VOMITING: 0
DIARRHEA: 0
SHORTNESS OF BREATH: 0
WHEEZING: 0

## 2023-01-19 ASSESSMENT — PATIENT HEALTH QUESTIONNAIRE - PHQ9
SUM OF ALL RESPONSES TO PHQ9 QUESTIONS 1 & 2: 0
SUM OF ALL RESPONSES TO PHQ QUESTIONS 1-9: 0
1. LITTLE INTEREST OR PLEASURE IN DOING THINGS: 0
2. FEELING DOWN, DEPRESSED OR HOPELESS: 0
SUM OF ALL RESPONSES TO PHQ QUESTIONS 1-9: 0

## 2023-01-19 NOTE — PROGRESS NOTES
[address] PRIMARY CARE  [address] [place] [address] [address] [place] [address]  Dept: [phone]  Dept Fax: [phone]    Lucila Guerrero is a 45 y.o. male who is a Established patient, who presents today for his medical conditions/complaints as noted below:  Chief Complaint   Patient presents with    Discuss Labs     8 2022    Asthma         HPI:     He is here today to follow-up on asthma. He says that he is doing good on the current inhalers and has not had any flareup. He says that usually in winter weather he starts getting worsening allergies, has been taking Flonase and Allegra which have been helping. He has been more active exercising regularly and has lost about 30 pounds since last visit. He says that helps with his bilateral knee pain as well. He follows with gastroenterology for his liver cirrhosis. Denies any other issues today.       Hemoglobin A1C (%)   Date Value   08/26/2022 5.5   06/11/2021 5.3   11/30/2020 5.4             ( goal A1Cis < 7)   No results found for: LABMICR  LDL Calculated (mg/dL)   Date Value   08/26/2022 63   06/11/2021 53   08/05/2019 59       (goal LDL is <100)   AST (U/L)   Date Value   08/26/2022 24     ALT (U/L)   Date Value   08/26/2022 14     BUN (mg/dL)   Date Value   08/26/2022 12     BP Readings from Last 3 Encounters:   01/19/23 126/84   07/19/22 125/83   01/20/22 111/75          (goal 120/80)    Past Medical History:   Diagnosis Date    Alcoholism /alcohol abuse 8/21/2014    Allergic rhinitis, cause unspecified 8/7/2014    Cirrhosis with alcoholism ([address] 8/21/2014    End-stage liver disease ([address] 8/21/2014    Esophageal reflux 8/7/2014    Essential hypertension, benign 8/7/2014    Portal hypertension with esophageal varices ([address] 8/21/2014      Past Surgical History:   Procedure Laterality Date    CHALAZION EXCISION Right 08/2017    UPPER GASTROINTESTINAL ENDOSCOPY  12/13/2016       Family History   Problem Relation Age of Onset Hypertension Mother        Social History     Tobacco Use    Smoking status: Never    Smokeless tobacco: Never   Substance Use Topics    Alcohol use: No      Current Outpatient Medications   Medication Sig Dispense Refill    zoster recombinant adjuvanted vaccine (SHINGRIX) 50 MCG/0.5ML SUSR injection Inject 0.5 mLs into the muscle once for 1 dose 50 MCG IM then repeat 2-6 months. 1 each 1    meloxicam (MOBIC) 15 MG tablet Take 1 tablet by mouth daily 90 tablet 1    doxycycline hyclate (VIBRAMYCIN) 100 MG capsule Take 100 mg by mouth daily      Cholecalciferol (VITAMIN D3) 50 MCG (2000 UT) TABS Take 1 tablet by mouth daily 30 tablet 5    fluticasone-salmeterol (ADVAIR HFA) 230-21 MCG/ACT inhaler Inhale 2 puffs into the lungs in the morning and 2 puffs before bedtime. 12 g 1    prednisoLONE acetate (PRED FORTE) 1 % ophthalmic suspension prednisolone acetate 1 % eye drops,suspension      ibuprofen (ADVIL;MOTRIN) 800 MG tablet take 1 tablet by mouth every 8 hours if needed      fluticasone (FLOVENT HFA) 110 MCG/ACT inhaler Inhale 2 puffs into the lungs in the morning and 2 puffs before bedtime. 2 each 0    cyclobenzaprine (FLEXERIL) 5 MG tablet take 1 tablet by mouth twice a day if needed for muscle spasm 20 tablet 0    diclofenac sodium (VOLTAREN) 1 % GEL diclofenac 1 % topical gel 100 g 1    clindamycin-benzoyl peroxide (BENZACLIN) 1-5 % gel Apply topically 2 times daily.  50 g 1    Handicap Placard MISC by Does not apply route Exp 1/2027 1 each 0    albuterol (PROVENTIL) (2.5 MG/3ML) 0.083% nebulizer solution Take 3 mLs by nebulization every 4 hours as needed for Wheezing or Shortness of Breath 1 Package 2    albuterol sulfate HFA (VENTOLIN HFA) 108 (90 Base) MCG/ACT inhaler inhale 2 puffs by mouth every 6 hours if needed for wheezing 2 Inhaler 1    folic acid (FOLVITE) 1 MG tablet take 1 tablet by mouth once daily      fluticasone (FLONASE) 50 MCG/ACT nasal spray 1 spray by Nasal route daily 1 Bottle 3 fexofenadine (ALLEGRA) 180 MG tablet Take 1 tablet by mouth daily 30 tablet 5    propranolol (INDERAL) 10 MG tablet Take 10 mg by mouth 2 times daily      Multiple Vitamins-Minerals (MULTIVITAMIN ADULT PO) Take 1 tablet by mouth      azaTHIOprine (IMURAN) 50 MG tablet Take 50 mg by mouth 3 times daily   0    ciprofloxacin (CILOXAN) 0.3 % ophthalmic solution ciprofloxacin 0.3 % eye drops   instill 1 drop into left eye four times a day       No current facility-administered medications for this visit. Allergies   Allergen Reactions    Acetaminophen Other (See Comments)     Patient has an autoimmune disease and was told that he should not take this medication   Patient has an autoimmune disease and was told that he should not take this medication        Health Maintenance   Topic Date Due    HIV screen  Never done    Shingles vaccine (2 of 2) 09/30/2017    Depression Screen  06/05/2023    Diabetes screen  08/26/2025    Lipids  08/26/2027    DTaP/Tdap/Td vaccine (2 - Td or Tdap) 10/05/2027    Colorectal Cancer Screen  04/10/2029    Hepatitis A vaccine  Completed    Hepatitis B vaccine  Completed    Flu vaccine  Completed    Pneumococcal 0-64 years Vaccine  Completed    COVID-19 Vaccine  Completed    Hepatitis C screen  Completed    Hib vaccine  Aged Out    Meningococcal (ACWY) vaccine  Aged Out       Subjective:     Review of Systems   Constitutional:  Negative for appetite change, fatigue and fever. HENT:  Negative for congestion, ear pain, hearing loss and sore throat. Eyes:  Negative for discharge and visual disturbance. Respiratory:  Negative for cough, chest tightness, shortness of breath and wheezing. Cardiovascular:  Negative for chest pain, palpitations and leg swelling. Gastrointestinal:  Negative for abdominal pain, constipation, diarrhea, nausea and vomiting. Genitourinary:  Negative for flank pain, frequency, hematuria and urgency. Musculoskeletal:  Positive for arthralgias.  Negative for gait problem, joint swelling and myalgias. Skin: Negative. Allergic/Immunologic: Positive for environmental allergies. Neurological:  Negative for dizziness, weakness, numbness and headaches. Psychiatric/Behavioral: Negative. Negative for dysphoric mood. The patient is not nervous/anxious. Objective:     Physical Exam  Vitals reviewed. Constitutional:       Appearance: Normal appearance. He is obese. HENT:      Head: Normocephalic and atraumatic. Nose: Nose normal.      Mouth/Throat:      Mouth: Mucous membranes are moist.      Pharynx: Oropharynx is clear. Eyes:      Extraocular Movements: Extraocular movements intact. Conjunctiva/sclera: Conjunctivae normal.      Pupils: Pupils are equal, round, and reactive to light. Cardiovascular:      Rate and Rhythm: Normal rate and regular rhythm. Heart sounds: Normal heart sounds. No murmur heard. No gallop. Pulmonary:      Effort: Pulmonary effort is normal. No respiratory distress. Breath sounds: Normal breath sounds. No stridor. No wheezing. Abdominal:      General: Bowel sounds are normal. There is no distension. Palpations: Abdomen is soft. Tenderness: There is no abdominal tenderness. There is no guarding or rebound. Musculoskeletal:         General: No swelling or tenderness. Normal range of motion. Cervical back: Normal range of motion and neck supple. Skin:     General: Skin is warm and dry. Coloration: Skin is not jaundiced. Findings: No rash. Neurological:      General: No focal deficit present. Mental Status: He is alert and oriented to person, place, and time. Psychiatric:         Mood and Affect: Mood normal.         Behavior: Behavior normal.         Thought Content:  Thought content normal.         Judgment: Judgment normal.     /84   Pulse 68   Temp 97.2 °F (36.2 °C)   Ht 6' (1.829 m)   Wt 300 lb (136.1 kg)   SpO2 96%   BMI 40.69 kg/m²     Assessment/Plan: 1. Mild intermittent asthma without complication  2. Bilateral primary osteoarthritis of knee  -     meloxicam (MOBIC) 15 MG tablet; Take 1 tablet by mouth daily, Disp-90 tablet, R-1Normal  3. BMI 40.0-44.9, adult (HCC)  -     CBC with Auto Differential; Future  -     Comprehensive Metabolic Panel, Fasting; Future  -     TSH with Reflex; Future  4. Vitamin D deficiency  -     Cholecalciferol (VITAMIN D3) 50 MCG (2000 UT) TABS; Take 1 tablet by mouth daily, Disp-30 tablet, R-5Normal  -     Vitamin D 25 Hydroxy; Future  5. Screening for prostate cancer  -     PSA Screening; Future  6. Diabetes mellitus screening  -     Hemoglobin A1C; Future  7. Lipid screening  -     Lipid, Fasting; Future  8. Need for prophylactic vaccination and inoculation against varicella  -     zoster recombinant adjuvanted vaccine [facility]) 50 MCG/0.5ML SUSR injection; Inject 0.5 mLs into the muscle once for 1 dose 50 MCG IM then repeat 2-6 months., Disp-1 each, R-1Print      Asthma-controlled, on daily Advair and albuterol as needed    Bilateral knee osteoarthritis-history of left knee replacement, takes meloxicam daily as needed for pain relief    Return in about 6 months (around 7/19/2023) for asthma.     Orders Placed This Encounter   Procedures    CBC with Auto Differential     Standing Status:   Future     Standing Expiration Date:   7/19/2023    Comprehensive Metabolic Panel, Fasting     Standing Status:   Future     Standing Expiration Date:   7/19/2023    Hemoglobin A1C     Standing Status:   Future     Standing Expiration Date:   7/19/2023    Lipid, Fasting     Standing Status:   Future     Standing Expiration Date:   7/19/2023    PSA Screening     Standing Status:   Future     Standing Expiration Date:   7/19/2023    TSH with Reflex     Standing Status:   Future     Standing Expiration Date:   7/19/2023    Vitamin D 25 Hydroxy     Standing Status:   Future     Standing Expiration Date:   7/19/2023       Orders Placed This Encounter   Medications    zoster recombinant adjuvanted vaccine [facility]) 50 MCG/0.5ML SUSR injection     Sig: Inject 0.5 mLs into the muscle once for 1 dose 50 MCG IM then repeat 2-6 months. Dispense:  1 each     Refill:  1    meloxicam (MOBIC) 15 MG tablet     Sig: Take 1 tablet by mouth daily     Dispense:  90 tablet     Refill:  1    Cholecalciferol (VITAMIN D3) 50 MCG (2000 UT) TABS     Sig: Take 1 tablet by mouth daily     Dispense:  30 tablet     Refill:  5       Patient given educational materials - see patient instructions. Discussed use, benefit, and side effects of prescribed medications. All patientquestions answered. Pt voiced understanding. Reviewed health maintenance. Instructedto continue current medications, diet and exercise. Patient agreed with treatmentplan. Follow up as directed.      Electronically signed by Shaila Melendez MD on 1/19/2023 at 4:10 PM

## 2023-06-07 DIAGNOSIS — Z13.220 LIPID SCREENING: ICD-10-CM

## 2023-06-07 DIAGNOSIS — E55.9 VITAMIN D DEFICIENCY: ICD-10-CM

## 2023-06-07 DIAGNOSIS — Z13.1 DIABETES MELLITUS SCREENING: ICD-10-CM

## 2023-06-07 DIAGNOSIS — Z12.5 SCREENING FOR PROSTATE CANCER: ICD-10-CM

## 2023-06-07 LAB
ALBUMIN SERPL-MCNC: 3.8 G/DL
ALP BLD-CCNC: 71 U/L
ALT SERPL-CCNC: 15 U/L
AST SERPL-CCNC: 25 U/L
AVERAGE GLUCOSE: 111
BASOPHILS ABSOLUTE: 0.1 /ΜL
BASOPHILS RELATIVE PERCENT: 2 %
BILIRUB SERPL-MCNC: 1 MG/DL (ref 0.1–1.4)
BUN BLDV-MCNC: 10 MG/DL
CALCIUM SERPL-MCNC: 9.3 MG/DL
CHLORIDE BLD-SCNC: 105 MMOL/L
CHOLESTEROL, FASTING: 123
CO2: 28 MMOL/L
CREAT SERPL-MCNC: 0.76 MG/DL
EOSINOPHILS ABSOLUTE: 0.2 /ΜL
EOSINOPHILS RELATIVE PERCENT: 5.1 %
GLUCOSE FASTING: 78 MG/DL
HBA1C MFR BLD: 5.5 %
HCT VFR BLD CALC: 44.7 % (ref 41–53)
HDLC SERPL-MCNC: 55 MG/DL (ref 35–70)
HEMOGLOBIN: 15.1 G/DL (ref 13.5–17.5)
LDL CHOLESTEROL CALCULATED: 58 MG/DL (ref 0–160)
LYMPHOCYTES ABSOLUTE: 1.5 /ΜL
LYMPHOCYTES RELATIVE PERCENT: 34.6 %
MCH RBC QN AUTO: 33.9 PG
MCHC RBC AUTO-ENTMCNC: 33.8 G/DL
MCV RBC AUTO: 100 FL
MONOCYTES ABSOLUTE: 0.4 /ΜL
MONOCYTES RELATIVE PERCENT: 10.1 %
NEUTROPHILS ABSOLUTE: 2.1 /ΜL
NEUTROPHILS RELATIVE PERCENT: 48.2 %
PDW BLD-RTO: 14.9 %
PLATELET # BLD: 128 K/ΜL
PMV BLD AUTO: 9.1 FL
POTASSIUM SERPL-SCNC: 4.3 MMOL/L
PROSTATE SPECIFIC ANTIGEN: 0.32 NG/ML
RBC # BLD: 4.46 10^6/ΜL
SODIUM BLD-SCNC: 138 MMOL/L
TOTAL PROTEIN: 7.3 G/DL (ref 6.4–8.2)
TRIGLYCERIDE, FASTING: 52
TSH SERPL DL<=0.05 MIU/L-ACNC: 1.2 UIU/ML
VITAMIN D 25-HYDROXY: 56.8
VITAMIN D2, 25 HYDROXY: NORMAL
VITAMIN D3,25 HYDROXY: NORMAL
WBC # BLD: 4.4 10^3/ML

## 2023-07-07 DIAGNOSIS — M17.0 BILATERAL PRIMARY OSTEOARTHRITIS OF KNEE: ICD-10-CM

## 2023-07-10 DIAGNOSIS — M17.0 BILATERAL PRIMARY OSTEOARTHRITIS OF KNEE: ICD-10-CM

## 2023-07-10 DIAGNOSIS — E55.9 VITAMIN D DEFICIENCY: ICD-10-CM

## 2023-07-11 RX ORDER — MELOXICAM 15 MG/1
TABLET ORAL
Qty: 30 TABLET | Refills: 1 | Status: SHIPPED | OUTPATIENT
Start: 2023-07-11

## 2023-07-11 RX ORDER — MELOXICAM 15 MG/1
15 TABLET ORAL DAILY
Qty: 90 TABLET | Refills: 1 | Status: SHIPPED | OUTPATIENT
Start: 2023-07-11

## 2023-07-11 RX ORDER — CHOLECALCIFEROL (VITAMIN D3) 125 MCG
1 CAPSULE ORAL DAILY
Qty: 30 TABLET | Refills: 5 | Status: SHIPPED | OUTPATIENT
Start: 2023-07-11 | End: 2023-08-10

## 2023-07-11 NOTE — TELEPHONE ENCOUNTER
Sarah Minor is calling to request a refill on the following medication(s):    Medication Request:  Requested Prescriptions     Pending Prescriptions Disp Refills    meloxicam (MOBIC) 15 MG tablet 90 tablet 1     Sig: Take 1 tablet by mouth daily    Cholecalciferol (VITAMIN D3) 50 MCG (2000 UT) TABS 30 tablet 5     Sig: Take 1 tablet by mouth daily       Last Visit Date (If Applicable):  2/55/9264    Next Visit Date:    8/3/2023

## 2023-07-11 NOTE — TELEPHONE ENCOUNTER
Wendy Flood is calling to request a refill on the following medication(s):    Medication Request:  Requested Prescriptions     Pending Prescriptions Disp Refills    meloxicam (MOBIC) 15 MG tablet [Pharmacy Med Name: MELOXICAM 15 MG TABLET] 30 tablet      Sig: take 1 tablet by mouth once daily       Last Visit Date (If Applicable):  0/11/7503    Next Visit Date:    7/10/2023

## 2023-11-03 DIAGNOSIS — J45.20 MILD INTERMITTENT ASTHMA WITHOUT COMPLICATION: ICD-10-CM

## 2023-11-03 NOTE — TELEPHONE ENCOUNTER
Oscar Najera is calling to request a refill on the following medication(s):    Medication Request:  Requested Prescriptions     Pending Prescriptions Disp Refills    albuterol (PROVENTIL) (2.5 MG/3ML) 0.083% nebulizer solution       Sig: Take 3 mLs by nebulization every 4 hours as needed for Wheezing or Shortness of Breath       Last Visit Date (If Applicable):  5/63/3772    Next Visit Date:    Visit date not found

## 2023-11-05 RX ORDER — ALBUTEROL SULFATE 2.5 MG/3ML
2.5 SOLUTION RESPIRATORY (INHALATION) EVERY 4 HOURS PRN
OUTPATIENT
Start: 2023-11-05 | End: 2023-12-05

## 2023-11-06 DIAGNOSIS — J45.20 MILD INTERMITTENT ASTHMA WITHOUT COMPLICATION: ICD-10-CM

## 2023-11-06 RX ORDER — ALBUTEROL SULFATE 90 UG/1
AEROSOL, METERED RESPIRATORY (INHALATION)
Qty: 2 EACH | Refills: 1 | Status: SHIPPED | OUTPATIENT
Start: 2023-11-06

## 2023-11-06 NOTE — TELEPHONE ENCOUNTER
John Parmar is calling to request a refill on the following medication(s):    Medication Request:  Requested Prescriptions     Pending Prescriptions Disp Refills    albuterol sulfate HFA (VENTOLIN HFA) 108 (90 Base) MCG/ACT inhaler 2 each 1     Sig: inhale 2 puffs by mouth every 6 hours if needed for wheezing       Last Visit Date (If Applicable):  9/40/1331    Next Visit Date:    1/3/2024

## 2023-11-29 DIAGNOSIS — E55.9 VITAMIN D DEFICIENCY: ICD-10-CM

## 2023-11-29 DIAGNOSIS — J45.20 MILD INTERMITTENT ASTHMA WITHOUT COMPLICATION: ICD-10-CM

## 2023-11-29 RX ORDER — CHOLECALCIFEROL (VITAMIN D3) 125 MCG
1 CAPSULE ORAL DAILY
Qty: 30 TABLET | Refills: 5 | Status: SHIPPED | OUTPATIENT
Start: 2023-11-29 | End: 2023-12-29

## 2023-11-29 RX ORDER — FLUTICASONE PROPIONATE AND SALMETEROL XINAFOATE 230; 21 UG/1; UG/1
2 AEROSOL, METERED RESPIRATORY (INHALATION) 2 TIMES DAILY
Qty: 12 G | Refills: 1 | Status: SHIPPED | OUTPATIENT
Start: 2023-11-29

## 2023-11-29 NOTE — TELEPHONE ENCOUNTER
Flor Shetty is calling to request a refill on the following medication(s):    Medication Request:  Requested Prescriptions     Pending Prescriptions Disp Refills    Cholecalciferol (VITAMIN D3) 50 MCG (2000 UT) TABS 30 tablet 5     Sig: Take 1 tablet by mouth daily       Last Visit Date (If Applicable):  0/04/7341    Next Visit Date:    1/3/2024

## 2024-01-03 ENCOUNTER — OFFICE VISIT (OUTPATIENT)
Dept: FAMILY MEDICINE CLINIC | Age: 54
End: 2024-01-03
Payer: MEDICAID

## 2024-01-03 VITALS
BODY MASS INDEX: 44.1 KG/M2 | OXYGEN SATURATION: 97 % | HEIGHT: 71 IN | SYSTOLIC BLOOD PRESSURE: 116 MMHG | WEIGHT: 315 LBS | DIASTOLIC BLOOD PRESSURE: 80 MMHG | HEART RATE: 63 BPM

## 2024-01-03 DIAGNOSIS — Z13.1 DIABETES MELLITUS SCREENING: ICD-10-CM

## 2024-01-03 DIAGNOSIS — Z13.220 LIPID SCREENING: ICD-10-CM

## 2024-01-03 DIAGNOSIS — N52.9 ERECTILE DYSFUNCTION, UNSPECIFIED ERECTILE DYSFUNCTION TYPE: ICD-10-CM

## 2024-01-03 DIAGNOSIS — E55.9 VITAMIN D DEFICIENCY: ICD-10-CM

## 2024-01-03 DIAGNOSIS — G47.33 OBSTRUCTIVE SLEEP APNEA SYNDROME: Primary | ICD-10-CM

## 2024-01-03 DIAGNOSIS — R06.02 SHORTNESS OF BREATH: ICD-10-CM

## 2024-01-03 DIAGNOSIS — Z12.5 SCREENING FOR PROSTATE CANCER: ICD-10-CM

## 2024-01-03 PROCEDURE — 99214 OFFICE O/P EST MOD 30 MIN: CPT | Performed by: STUDENT IN AN ORGANIZED HEALTH CARE EDUCATION/TRAINING PROGRAM

## 2024-01-03 RX ORDER — DOXYCYCLINE HYCLATE 100 MG/1
100 CAPSULE ORAL EVERY MORNING
COMMUNITY
Start: 2023-12-28

## 2024-01-03 RX ORDER — FLUOROMETHOLONE 0.1 %
SUSPENSION, DROPS(FINAL DOSAGE FORM)(ML) OPHTHALMIC (EYE)
COMMUNITY
Start: 2023-12-26

## 2024-01-03 SDOH — ECONOMIC STABILITY: FOOD INSECURITY: WITHIN THE PAST 12 MONTHS, THE FOOD YOU BOUGHT JUST DIDN'T LAST AND YOU DIDN'T HAVE MONEY TO GET MORE.: NEVER TRUE

## 2024-01-03 SDOH — ECONOMIC STABILITY: INCOME INSECURITY: HOW HARD IS IT FOR YOU TO PAY FOR THE VERY BASICS LIKE FOOD, HOUSING, MEDICAL CARE, AND HEATING?: NOT HARD AT ALL

## 2024-01-03 SDOH — ECONOMIC STABILITY: HOUSING INSECURITY
IN THE LAST 12 MONTHS, WAS THERE A TIME WHEN YOU DID NOT HAVE A STEADY PLACE TO SLEEP OR SLEPT IN A SHELTER (INCLUDING NOW)?: NO

## 2024-01-03 SDOH — ECONOMIC STABILITY: FOOD INSECURITY: WITHIN THE PAST 12 MONTHS, YOU WORRIED THAT YOUR FOOD WOULD RUN OUT BEFORE YOU GOT MONEY TO BUY MORE.: NEVER TRUE

## 2024-01-03 ASSESSMENT — PATIENT HEALTH QUESTIONNAIRE - PHQ9
SUM OF ALL RESPONSES TO PHQ QUESTIONS 1-9: 0
1. LITTLE INTEREST OR PLEASURE IN DOING THINGS: 0
SUM OF ALL RESPONSES TO PHQ9 QUESTIONS 1 & 2: 0
2. FEELING DOWN, DEPRESSED OR HOPELESS: 0
SUM OF ALL RESPONSES TO PHQ QUESTIONS 1-9: 0

## 2024-01-03 ASSESSMENT — ENCOUNTER SYMPTOMS
SHORTNESS OF BREATH: 1
CHEST TIGHTNESS: 0
DIARRHEA: 0
WHEEZING: 0
ABDOMINAL PAIN: 0
COUGH: 0
SORE THROAT: 0
VOMITING: 0
CONSTIPATION: 0
EYE DISCHARGE: 0
NAUSEA: 0

## 2024-01-03 NOTE — PROGRESS NOTES
[facility]  [address]  [address]  [facility] [address]  Dept: [phone]  Dept Fax: [phone]    Jarad Yo is a 53 y.o. male who is a Established patient, who presents today for his medical conditions/complaints as noted below:  Chief Complaint   Patient presents with    Weight Loss    Shortness of Breath         HPI:     He is here today to discuss his ongoing issues with inability to lose weight as well as shortness of breath with exertion.  He states that he exercises 3-5 times a week for at least 30 minutes to an hour but has not noticed any change in his weight.  He also reports having shortness of breath with climbing stairs sometimes and walking for long distances.  He had a stress test done few years ago for which the exercise portion was positive but nuclear medicine portion was negative.  He states that he has not seen any cardiologist.  He also reports disturbed sleep with frequent snoring at night and daytime sleepiness and fatigue.  He has never had a sleep study done before.  He complains of having erectile dysfunction issues and would like to get testosterone levels checked.        Hemoglobin A1C (%)   Date Value   06/06/2023 5.5   08/26/2022 5.5   06/11/2021 5.3             ( goal A1Cis < 7)   No components found for: \"LABMICR\"  LDL Calculated (mg/dL)   Date Value   06/06/2023 58   08/26/2022 63   06/11/2021 53       (goal LDL is <100)   AST (U/L)   Date Value   06/06/2023 25     ALT (U/L)   Date Value   06/06/2023 15     BUN (mg/dL)   Date Value   06/06/2023 10     BP Readings from Last 3 Encounters:   01/03/24 116/80   01/19/23 126/84   07/19/22 125/83          (goal 120/80)    Past Medical History:   Diagnosis Date    Alcoholism /alcohol abuse 08/21/2014    Allergic rhinitis, cause unspecified 08/07/2014    Asthma Unknown    Cirrhosis with alcoholism (HCC) 08/21/2014    End-stage liver disease (HCC) 08/21/2014    Esophageal reflux 08/07/2014

## 2024-01-08 ENCOUNTER — HOSPITAL ENCOUNTER (OUTPATIENT)
Age: 54
Discharge: HOME | End: 2024-01-10
Payer: MEDICAID

## 2024-01-08 VITALS — BODY MASS INDEX: 44.1 KG/M2 | HEIGHT: 71 IN | WEIGHT: 315 LBS

## 2024-01-08 DIAGNOSIS — R06.02 SHORTNESS OF BREATH: ICD-10-CM

## 2024-01-08 DIAGNOSIS — I51.7 LEFT VENTRICULAR HYPERTROPHY: Primary | ICD-10-CM

## 2024-01-08 LAB
ECHO AO ROOT DIAM: 3.5 CM
ECHO AO ROOT INDEX: 1.36 CM/M2
ECHO AV AREA PEAK VELOCITY: 3.3 CM2
ECHO AV AREA VTI: 3.3 CM2
ECHO AV AREA/BSA PEAK VELOCITY: 1.3 CM2/M2
ECHO AV AREA/BSA VTI: 1.3 CM2/M2
ECHO AV MEAN GRADIENT: 5 MMHG
ECHO AV MEAN VELOCITY: 0.9 M/S
ECHO AV PEAK GRADIENT: 8 MMHG
ECHO AV PEAK VELOCITY: 1.4 M/S
ECHO AV VELOCITY RATIO: 0.86
ECHO AV VTI: 28.7 CM
ECHO BSA: 2.7 M2
ECHO EST RA PRESSURE: 10 MMHG
ECHO IVC PROX: 1.8 CM
ECHO LA AREA 2C: 16.2 CM2
ECHO LA AREA 4C: 25.6 CM2
ECHO LA DIAMETER INDEX: 1.78 CM/M2
ECHO LA DIAMETER: 4.6 CM
ECHO LA MAJOR AXIS: 7 CM
ECHO LA MINOR AXIS: 5.3 CM
ECHO LA TO AORTIC ROOT RATIO: 1.31
ECHO LA VOL MOD A2C: 40 ML (ref 18–58)
ECHO LA VOL MOD A4C: 77 ML (ref 18–58)
ECHO LA VOLUME INDEX MOD A2C: 16 ML/M2 (ref 16–34)
ECHO LA VOLUME INDEX MOD A4C: 30 ML/M2 (ref 16–34)
ECHO LV E' LATERAL VELOCITY: 13 CM/S
ECHO LV E' SEPTAL VELOCITY: 7 CM/S
ECHO LV EDV A2C: 69 ML
ECHO LV EDV A4C: 107 ML
ECHO LV EDV INDEX A4C: 41 ML/M2
ECHO LV EDV NDEX A2C: 27 ML/M2
ECHO LV EJECTION FRACTION A2C: 58 %
ECHO LV EJECTION FRACTION A4C: 61 %
ECHO LV EJECTION FRACTION BIPLANE: 59 % (ref 55–100)
ECHO LV ESV A2C: 29 ML
ECHO LV ESV A4C: 42 ML
ECHO LV ESV INDEX A2C: 11 ML/M2
ECHO LV ESV INDEX A4C: 16 ML/M2
ECHO LV INTERNAL DIMENSION DIASTOLE INDEX: 2.25 CM/M2
ECHO LV INTERNAL DIMENSION DIASTOLIC: 5.8 CM (ref 4.2–5.9)
ECHO LV IVSD: 1.4 CM (ref 0.6–1)
ECHO LV MASS 2D: 386.1 G (ref 88–224)
ECHO LV MASS INDEX 2D: 149.7 G/M2 (ref 49–115)
ECHO LV POSTERIOR WALL DIASTOLIC: 1.5 CM (ref 0.6–1)
ECHO LV RELATIVE WALL THICKNESS RATIO: 0.52
ECHO LVOT AREA: 3.8 CM2
ECHO LVOT AV VTI INDEX: 0.87
ECHO LVOT DIAM: 2.2 CM
ECHO LVOT MEAN GRADIENT: 3 MMHG
ECHO LVOT PEAK GRADIENT: 6 MMHG
ECHO LVOT PEAK VELOCITY: 1.2 M/S
ECHO LVOT STROKE VOLUME INDEX: 36.7 ML/M2
ECHO LVOT SV: 94.6 ML
ECHO LVOT VTI: 24.9 CM
ECHO MV A VELOCITY: 0.55 M/S
ECHO MV AREA VTI: 4.4 CM2
ECHO MV E DECELERATION TIME (DT): 294 MS
ECHO MV E VELOCITY: 0.63 M/S
ECHO MV E/A RATIO: 1.15
ECHO MV E/E' LATERAL: 4.85
ECHO MV E/E' RATIO (AVERAGED): 6.92
ECHO MV LVOT VTI INDEX: 0.86
ECHO MV MAX VELOCITY: 0.8 M/S
ECHO MV MEAN GRADIENT: 1 MMHG
ECHO MV MEAN VELOCITY: 0.4 M/S
ECHO MV PEAK GRADIENT: 2 MMHG
ECHO MV VTI: 21.5 CM
ECHO PULMONARY ARTERY END DIASTOLIC PRESSURE: 7 MMHG
ECHO PV MAX VELOCITY: 1 M/S
ECHO PV PEAK GRADIENT: 4 MMHG
ECHO PV REGURGITANT MAX VELOCITY: 1.3 M/S
ECHO RIGHT VENTRICULAR SYSTOLIC PRESSURE (RVSP): 34 MMHG
ECHO RV BASAL DIMENSION: 3.9 CM
ECHO RV INTERNAL DIMENSION: 3.5 CM
ECHO RV MID DIMENSION: 2.2 CM
ECHO RV TAPSE: 2.3 CM (ref 1.7–?)
ECHO TV REGURGITANT MAX VELOCITY: 2.47 M/S
ECHO TV REGURGITANT PEAK GRADIENT: 24 MMHG
STRESS BASELINE DIAS BP: 88 MMHG
STRESS BASELINE HR: 64 BPM
STRESS BASELINE SYS BP: 115 MMHG
STRESS ESTIMATED WORKLOAD: 6.8 METS
STRESS PEAK DIAS BP: 93 MMHG
STRESS PEAK SYS BP: 185 MMHG
STRESS PERCENT HR ACHIEVED: 64 %
STRESS POST PEAK HR: 106 BPM
STRESS RATE PRESSURE PRODUCT: NORMAL BPM*MMHG
STRESS TARGET HR: 165 BPM

## 2024-01-08 PROCEDURE — 93017 CV STRESS TEST TRACING ONLY: CPT

## 2024-01-08 PROCEDURE — 93016 CV STRESS TEST SUPVJ ONLY: CPT | Performed by: INTERNAL MEDICINE

## 2024-01-08 PROCEDURE — 93306 TTE W/DOPPLER COMPLETE: CPT | Performed by: INTERNAL MEDICINE

## 2024-01-08 PROCEDURE — 93018 CV STRESS TEST I&R ONLY: CPT | Performed by: INTERNAL MEDICINE

## 2024-01-08 PROCEDURE — 93306 TTE W/DOPPLER COMPLETE: CPT

## 2024-01-15 DIAGNOSIS — J45.20 MILD INTERMITTENT ASTHMA WITHOUT COMPLICATION: ICD-10-CM

## 2024-01-15 NOTE — TELEPHONE ENCOUNTER
Jarad Yo is calling to request a refill on the following medication(s):    Medication Request:  Requested Prescriptions     Pending Prescriptions Disp Refills    ADVAIR -21 MCG/ACT inhaler [Pharmacy Med Name: ADVAIR -21 MCG INHALER] 12 g 1     Sig: inhale 2 puffs by mouth and INTO THE LUNGS twice a day       Last Visit Date (If Applicable):  1/3/2024    Next Visit Date:    7/3/2024

## 2024-01-16 RX ORDER — FLUTICASONE PROPIONATE AND SALMETEROL XINAFOATE 230; 21 UG/1; UG/1
AEROSOL, METERED RESPIRATORY (INHALATION)
Qty: 12 G | Refills: 1 | Status: SHIPPED | OUTPATIENT
Start: 2024-01-16

## 2024-01-21 ENCOUNTER — HOSPITAL ENCOUNTER (OUTPATIENT)
Dept: SLEEP CENTER | Age: 54
Discharge: HOME | End: 2024-01-23
Payer: MEDICAID

## 2024-01-21 VITALS — WEIGHT: 315 LBS | BODY MASS INDEX: 44.1 KG/M2 | HEIGHT: 71 IN

## 2024-01-21 DIAGNOSIS — G47.33 OBSTRUCTIVE SLEEP APNEA SYNDROME: ICD-10-CM

## 2024-01-21 PROCEDURE — 95810 POLYSOM 6/> YRS 4/> PARAM: CPT

## 2024-01-28 LAB — STATUS: NORMAL

## 2024-01-29 DIAGNOSIS — G47.33 OBSTRUCTIVE SLEEP APNEA: Primary | ICD-10-CM

## 2024-02-25 ENCOUNTER — HOSPITAL ENCOUNTER (OUTPATIENT)
Dept: SLEEP CENTER | Age: 54
Discharge: HOME | End: 2024-02-27
Payer: COMMERCIAL

## 2024-02-25 VITALS — WEIGHT: 315 LBS | HEIGHT: 71 IN | BODY MASS INDEX: 44.1 KG/M2

## 2024-02-25 DIAGNOSIS — G47.33 OBSTRUCTIVE SLEEP APNEA: ICD-10-CM

## 2024-02-25 PROCEDURE — 95811 POLYSOM 6/>YRS CPAP 4/> PARM: CPT

## 2024-03-11 LAB — STATUS: NORMAL

## 2024-03-15 ENCOUNTER — APPOINTMENT (OUTPATIENT)
Dept: GENERAL RADIOLOGY | Age: 54
End: 2024-03-15
Attending: EMERGENCY MEDICINE
Payer: COMMERCIAL

## 2024-03-15 ENCOUNTER — HOSPITAL ENCOUNTER (EMERGENCY)
Age: 54
Discharge: HOME | End: 2024-03-15
Attending: EMERGENCY MEDICINE
Payer: COMMERCIAL

## 2024-03-15 VITALS
HEIGHT: 71 IN | TEMPERATURE: 97.9 F | HEART RATE: 76 BPM | RESPIRATION RATE: 16 BRPM | WEIGHT: 310 LBS | BODY MASS INDEX: 43.4 KG/M2 | SYSTOLIC BLOOD PRESSURE: 123 MMHG | OXYGEN SATURATION: 97 % | DIASTOLIC BLOOD PRESSURE: 80 MMHG

## 2024-03-15 DIAGNOSIS — M54.31 SCIATICA OF RIGHT SIDE: Primary | ICD-10-CM

## 2024-03-15 PROCEDURE — 72100 X-RAY EXAM L-S SPINE 2/3 VWS: CPT

## 2024-03-15 PROCEDURE — 99284 EMERGENCY DEPT VISIT MOD MDM: CPT

## 2024-03-15 PROCEDURE — 6370000000 HC RX 637 (ALT 250 FOR IP): Performed by: EMERGENCY MEDICINE

## 2024-03-15 PROCEDURE — 6360000002 HC RX W HCPCS: Performed by: EMERGENCY MEDICINE

## 2024-03-15 PROCEDURE — 96372 THER/PROPH/DIAG INJ SC/IM: CPT

## 2024-03-15 RX ORDER — PREDNISONE 20 MG/1
50 TABLET ORAL ONCE
Status: COMPLETED | OUTPATIENT
Start: 2024-03-15 | End: 2024-03-15

## 2024-03-15 RX ORDER — METHYLPREDNISOLONE 4 MG/1
TABLET ORAL
Qty: 1 KIT | Refills: 0 | Status: SHIPPED | OUTPATIENT
Start: 2024-03-15 | End: 2024-03-21

## 2024-03-15 RX ORDER — OXYCODONE HYDROCHLORIDE AND ACETAMINOPHEN 5; 325 MG/1; MG/1
1 TABLET ORAL EVERY 6 HOURS PRN
Qty: 28 TABLET | Refills: 0 | Status: SHIPPED | OUTPATIENT
Start: 2024-03-15 | End: 2024-03-22

## 2024-03-15 RX ORDER — DIAZEPAM 5 MG/1
5 TABLET ORAL ONCE
Status: COMPLETED | OUTPATIENT
Start: 2024-03-15 | End: 2024-03-15

## 2024-03-15 RX ORDER — CYCLOBENZAPRINE HCL 10 MG
10 TABLET ORAL 3 TIMES DAILY PRN
Qty: 30 TABLET | Refills: 0 | Status: SHIPPED | OUTPATIENT
Start: 2024-03-15 | End: 2024-03-25

## 2024-03-15 RX ADMIN — PREDNISONE 50 MG: 20 TABLET ORAL at 10:52

## 2024-03-15 RX ADMIN — DIAZEPAM 5 MG: 5 TABLET ORAL at 10:52

## 2024-03-15 RX ADMIN — HYDROMORPHONE HYDROCHLORIDE 0.5 MG: 1 INJECTION, SOLUTION INTRAMUSCULAR; INTRAVENOUS; SUBCUTANEOUS at 10:54

## 2024-03-15 ASSESSMENT — PAIN SCALES - GENERAL
PAINLEVEL_OUTOF10: 5
PAINLEVEL_OUTOF10: 9
PAINLEVEL_OUTOF10: 7

## 2024-03-15 ASSESSMENT — PAIN - FUNCTIONAL ASSESSMENT: PAIN_FUNCTIONAL_ASSESSMENT: 0-10

## 2024-03-15 NOTE — ED PROVIDER NOTES
EMERGENCY DEPARTMENT ENCOUNTER    Pt Name: Jarad Yo  MRN: [id number]  Birthdate 1970  Date of evaluation: 3/15/24  CHIEF COMPLAINT       Chief Complaint   Patient presents with    Back Pain     Radiates down leg     HISTORY OF PRESENT ILLNESS   HPI  53-year-old male with a history of cirrhosis, hypertension presents to the ED for right lower back pain radiating down his right leg for the past several days.  Patient denies any trauma or injury, states that the pain is worse with standing and walking, slightly relieved by laying down.  He denies any leg weakness or numbness, saddle anesthesia, urinary retention/incontinence, fever/chills, dysuria or any other acute complaints.    REVIEW OF SYSTEMS     Review of Systems   All other systems reviewed and are negative.    PASTMEDICAL HISTORY     Past Medical History:   Diagnosis Date    Alcoholism /alcohol abuse 08/21/2014    Allergic rhinitis, cause unspecified 08/07/2014    Asthma Unknown    Cirrhosis with alcoholism (HCC) 08/21/2014    End-stage liver disease (HCC) 08/21/2014    Esophageal reflux 08/07/2014    Essential hypertension, benign 08/07/2014    Obesity 1/1/1990    Long time but trying to lose    Portal hypertension with esophageal varices (HCC) 08/21/2014     SURGICAL HISTORY       Past Surgical History:   Procedure Laterality Date    CHALAZION EXCISION Right 08/2017    JOINT REPLACEMENT  8/1/2019    Knee    UPPER GASTROINTESTINAL ENDOSCOPY  12/13/2016     CURRENT MEDICATIONS       Discharge Medication List as of 3/15/2024 11:59 AM        CONTINUE these medications which have NOT CHANGED    Details   ADVAIR -21 MCG/ACT inhaler inhale 2 puffs by mouth and INTO THE LUNGS twice a day, Disp-12 g, R-1Normal      fluorometholone (FML) 0.1 % ophthalmic suspension instill 1 drop into both eyes four times a day every morning AT N...  (REFER TO PRESCRIPTION NOTES).Historical Med      doxycycline hyclate (VIBRAMYCIN) 100 MG capsule Take 1 capsule by

## 2024-03-15 NOTE — DISCHARGE INSTRUCTIONS
Patient is requesting a call back once refill has been sent to pharmacy  Use Flexeril as needed for muscle spasms, Percocet as needed for breakthrough pain.  Starting tomorrow take the Medrol Dosepak as prescribed.  If you have significant worsening of your pain or any new concerning symptoms come back to the ER.  Call the number provided above for Dr. Trejo, he is a spine specialist that you can follow-up with.

## 2024-03-18 ENCOUNTER — TELEPHONE (OUTPATIENT)
Dept: ORTHOPEDIC SURGERY | Age: 54
End: 2024-03-18

## 2024-03-18 DIAGNOSIS — M17.0 BILATERAL PRIMARY OSTEOARTHRITIS OF KNEE: ICD-10-CM

## 2024-03-18 RX ORDER — MELOXICAM 15 MG/1
15 TABLET ORAL DAILY
Qty: 30 TABLET | Refills: 0 | Status: SHIPPED | OUTPATIENT
Start: 2024-03-18

## 2024-03-18 NOTE — TELEPHONE ENCOUNTER
Left the patient a message for a return call regarding his appointment tomorrow on 3/19/24 to see if he would be open to seeing Juani Canseco PA-C instead of Dr. Trejo as he has not had extensive treatment yet.

## 2024-03-18 NOTE — TELEPHONE ENCOUNTER
Jarad Yo is calling to request a refill on the following medication(s):    Medication Request:  Requested Prescriptions     Pending Prescriptions Disp Refills    meloxicam (MOBIC) 15 MG tablet [Pharmacy Med Name: MELOXICAM 15 MG TABLET] 90 tablet 1     Sig: take 1 tablet by mouth once daily       Last Visit Date (If Applicable):  1/3/2024    Next Visit Date:    7/3/2024

## 2024-03-19 ENCOUNTER — TELEPHONE (OUTPATIENT)
Dept: FAMILY MEDICINE CLINIC | Age: 54
End: 2024-03-19

## 2024-03-19 ENCOUNTER — OFFICE VISIT (OUTPATIENT)
Dept: ORTHOPEDIC SURGERY | Age: 54
End: 2024-03-19
Payer: COMMERCIAL

## 2024-03-19 VITALS — HEIGHT: 71 IN | BODY MASS INDEX: 43.4 KG/M2 | RESPIRATION RATE: 16 BRPM | WEIGHT: 309.97 LBS

## 2024-03-19 DIAGNOSIS — M54.41 ACUTE RIGHT-SIDED LOW BACK PAIN WITH RIGHT-SIDED SCIATICA: Primary | ICD-10-CM

## 2024-03-19 PROCEDURE — 99203 OFFICE O/P NEW LOW 30 MIN: CPT | Performed by: PHYSICIAN ASSISTANT

## 2024-03-19 PROCEDURE — G8427 DOCREV CUR MEDS BY ELIG CLIN: HCPCS | Performed by: PHYSICIAN ASSISTANT

## 2024-03-19 PROCEDURE — 3017F COLORECTAL CA SCREEN DOC REV: CPT | Performed by: PHYSICIAN ASSISTANT

## 2024-03-19 PROCEDURE — G8417 CALC BMI ABV UP PARAM F/U: HCPCS | Performed by: PHYSICIAN ASSISTANT

## 2024-03-19 PROCEDURE — G8484 FLU IMMUNIZE NO ADMIN: HCPCS | Performed by: PHYSICIAN ASSISTANT

## 2024-03-19 PROCEDURE — 1036F TOBACCO NON-USER: CPT | Performed by: PHYSICIAN ASSISTANT

## 2024-03-19 RX ORDER — LIDOCAINE 50 MG/G
1 PATCH TOPICAL DAILY
Qty: 10 PATCH | Refills: 0 | Status: SHIPPED | OUTPATIENT
Start: 2024-03-19 | End: 2024-03-21 | Stop reason: SDUPTHER

## 2024-03-19 RX ORDER — LIDOCAINE 50 MG/G
1 PATCH TOPICAL DAILY
Qty: 10 PATCH | Refills: 0 | Status: CANCELLED | OUTPATIENT
Start: 2024-03-19 | End: 2024-03-29

## 2024-03-19 SDOH — HEALTH STABILITY: PHYSICAL HEALTH: ON AVERAGE, HOW MANY MINUTES DO YOU ENGAGE IN EXERCISE AT THIS LEVEL?: 60 MIN

## 2024-03-19 SDOH — HEALTH STABILITY: PHYSICAL HEALTH: ON AVERAGE, HOW MANY DAYS PER WEEK DO YOU ENGAGE IN MODERATE TO STRENUOUS EXERCISE (LIKE A BRISK WALK)?: 3 DAYS

## 2024-03-19 NOTE — PROGRESS NOTES
Patient ID: Jarad Yo is a 53 y.o. male    Chief Compliant:  Chief Complaint   Patient presents with    Lower Back Pain      HPI:  Patient is a 53 y.o. male who presents to the clinic today for evaluation of right-sided low back pain with radiculopathy in the right leg.  Patient was recently seen in the emergency room at [facility] where he was given Flexeril Percocet and a Medrol Dosepak for the next 5 days patient states that these medications did not seem to help.  He states that the right buttock pain wraps around into the front near his groin.  He denies any trauma injury or fall.  Denies any past surgical history to his back.  He states in the past couple days has been walking with a cane which is new to him.  He has difficult time with long periods of walking and sitting seems to bother him as well.  Patient denies any fever, chills, saddle anesthesia, bowel or bladder concerns..        Review of Systems   Constitutional: Negative for fever, chills, sweats.   Eyes: Negative for changes in vision, or pain.   HENT: Negative for ear ache, epistaxis, or sore throat.  Respiratory/Cardio: Negative for Chest pain, palpitations, SOB, or cough.  Gastrointestinal: Negative for abdominal pain, N/V/D.   Genitourinary: Negative for dysuria, frequency, urgency, or hematuria.   Neurological: Negative for headache, numbness, or weakness.   Integumentary: Negative for rash, itching, laceration, or abrasion.   Musculoskeletal: Positive for Lower Back Pain         Past History:    Current Outpatient Medications:     lidocaine (LIDODERM) 5 %, Place 1 patch onto the skin daily for 10 days 12 hours on, 12 hours off., Disp: 10 patch, Rfl: 0    meloxicam (MOBIC) 15 MG tablet, take 1 tablet by mouth once daily, Disp: 30 tablet, Rfl: 0    ADVAIR -21 MCG/ACT inhaler, inhale 2 puffs by mouth and INTO THE LUNGS twice a day, Disp: 12 g, Rfl: 1    fluorometholone (FML) 0.1 % ophthalmic suspension, instill 1 drop into both

## 2024-03-19 NOTE — TELEPHONE ENCOUNTER
[facility] ED Follow up Call    Reason for ED visit:  Sciatica of right side      3/19/2024     Hi Jarad , this is Kathie from [facility] office, just calling to see how you are doing after your recent ED visit.    LVM for patient to call back.         FU appts/Provider:    Future Appointments   Date Time Provider Department Center   3/19/2024  2:30 PM Juani Canseco PA [id number]   7/3/2024  9:15 AM Ginna De Oliveira MD [facility]         VOICEMAIL DOCUMENTATION - ERASE IF NOT USED  Hi, this message is for Jarad.  This is Kathie Patel MA from [facility] office.  Just calling to see how you are doing after your recent visit to the Emergency Room.  [facility] wants to make sure you were able to fill any prescriptions and that you understand your discharge instructions.  Please return our call if you need to make a follow up appointment with your provider or have any further needs.   Our phone number is [phone].  Have a great day.

## 2024-03-21 DIAGNOSIS — M54.41 ACUTE RIGHT-SIDED LOW BACK PAIN WITH RIGHT-SIDED SCIATICA: ICD-10-CM

## 2024-03-22 RX ORDER — LIDOCAINE 50 MG/G
1 PATCH TOPICAL DAILY
Qty: 10 PATCH | Refills: 0 | Status: SHIPPED | OUTPATIENT
Start: 2024-03-22 | End: 2024-04-01

## 2024-03-22 NOTE — TELEPHONE ENCOUNTER
LRF 3/19/24 #10    Associated Diagnoses: Acute right-sided low back pain with right-sided sciatica [M54.41]

## 2024-03-25 ENCOUNTER — PATIENT MESSAGE (OUTPATIENT)
Dept: ORTHOPEDIC SURGERY | Age: 54
End: 2024-03-25

## 2024-03-25 NOTE — TELEPHONE ENCOUNTER
Pt stating his pain is not going away and he can't get into PT for 10 days to be evaluated.  Pt said no meds are helping?  He doesn't feel he should be left to deal with this without some sort of help.  I asked him if he wanted to go to a different PT place to get in sooner but havent heard back.  Do you want to give him anything for the pain?

## 2024-03-25 NOTE — TELEPHONE ENCOUNTER
From: Jarad Yo  To: Juani Canseco  Sent: 3/25/2024 12:47 PM EDT  Subject: Back pain     Good afternoon,    I messaging pain remains at 8.5 to 9.5, I have taken all medications as prescribe with no progress. I continue to have limited movement the referal for therapy is as least another week and half out its only a assessment. In the meantime I have missed over week of work, with no idea of when I can return. This is frustrating with no treatment, no improvement and no idea. I seem to be stationary in waiting for weeks, I don't believe anyone should be left in this manner. Can you or someone help.    Jarad Yo

## 2024-03-26 ENCOUNTER — OFFICE VISIT (OUTPATIENT)
Dept: FAMILY MEDICINE CLINIC | Age: 54
End: 2024-03-26
Payer: COMMERCIAL

## 2024-03-26 VITALS
HEIGHT: 72 IN | DIASTOLIC BLOOD PRESSURE: 80 MMHG | OXYGEN SATURATION: 100 % | HEART RATE: 85 BPM | BODY MASS INDEX: 42.66 KG/M2 | WEIGHT: 315 LBS | SYSTOLIC BLOOD PRESSURE: 124 MMHG

## 2024-03-26 DIAGNOSIS — M54.41 CHRONIC RIGHT-SIDED LOW BACK PAIN WITH RIGHT-SIDED SCIATICA: Primary | ICD-10-CM

## 2024-03-26 DIAGNOSIS — G89.29 CHRONIC RIGHT-SIDED LOW BACK PAIN WITH RIGHT-SIDED SCIATICA: Primary | ICD-10-CM

## 2024-03-26 PROCEDURE — 99214 OFFICE O/P EST MOD 30 MIN: CPT | Performed by: STUDENT IN AN ORGANIZED HEALTH CARE EDUCATION/TRAINING PROGRAM

## 2024-03-26 PROCEDURE — 1036F TOBACCO NON-USER: CPT | Performed by: STUDENT IN AN ORGANIZED HEALTH CARE EDUCATION/TRAINING PROGRAM

## 2024-03-26 PROCEDURE — G8417 CALC BMI ABV UP PARAM F/U: HCPCS | Performed by: STUDENT IN AN ORGANIZED HEALTH CARE EDUCATION/TRAINING PROGRAM

## 2024-03-26 PROCEDURE — G8427 DOCREV CUR MEDS BY ELIG CLIN: HCPCS | Performed by: STUDENT IN AN ORGANIZED HEALTH CARE EDUCATION/TRAINING PROGRAM

## 2024-03-26 PROCEDURE — G8482 FLU IMMUNIZE ORDER/ADMIN: HCPCS | Performed by: STUDENT IN AN ORGANIZED HEALTH CARE EDUCATION/TRAINING PROGRAM

## 2024-03-26 PROCEDURE — 3017F COLORECTAL CA SCREEN DOC REV: CPT | Performed by: STUDENT IN AN ORGANIZED HEALTH CARE EDUCATION/TRAINING PROGRAM

## 2024-03-26 RX ORDER — CYCLOBENZAPRINE HCL 10 MG
10 TABLET ORAL NIGHTLY PRN
Qty: 20 TABLET | Refills: 0 | Status: SHIPPED | OUTPATIENT
Start: 2024-03-26 | End: 2024-04-15

## 2024-03-26 RX ORDER — METOPROLOL TARTRATE 50 MG/1
TABLET, FILM COATED ORAL
COMMUNITY
Start: 2024-02-01

## 2024-03-26 RX ORDER — BUMETANIDE 0.5 MG/1
0.5 TABLET ORAL
COMMUNITY
Start: 2024-01-11

## 2024-03-26 RX ORDER — DAPAGLIFLOZIN 10 MG/1
10 TABLET, FILM COATED ORAL
COMMUNITY
Start: 2024-01-11

## 2024-03-26 RX ORDER — OMEPRAZOLE 40 MG/1
40 CAPSULE, DELAYED RELEASE ORAL EVERY MORNING
COMMUNITY
Start: 2024-02-29

## 2024-03-26 RX ORDER — OXYCODONE HYDROCHLORIDE AND ACETAMINOPHEN 5; 325 MG/1; MG/1
1 TABLET ORAL EVERY 6 HOURS PRN
Qty: 18 TABLET | Refills: 0 | Status: SHIPPED | OUTPATIENT
Start: 2024-03-26 | End: 2024-04-02

## 2024-03-27 ASSESSMENT — ENCOUNTER SYMPTOMS
WHEEZING: 0
NAUSEA: 0
CHEST TIGHTNESS: 0
COUGH: 0
SHORTNESS OF BREATH: 0
CONSTIPATION: 0
ABDOMINAL PAIN: 0
DIARRHEA: 0
VOMITING: 0
EYE DISCHARGE: 0
BACK PAIN: 1
SORE THROAT: 0

## 2024-04-03 ENCOUNTER — HOSPITAL ENCOUNTER (OUTPATIENT)
Dept: MRI IMAGING | Age: 54
Discharge: HOME | End: 2024-04-05
Attending: PHYSICIAN ASSISTANT
Payer: COMMERCIAL

## 2024-04-03 DIAGNOSIS — M54.41 ACUTE RIGHT-SIDED LOW BACK PAIN WITH RIGHT-SIDED SCIATICA: ICD-10-CM

## 2024-04-03 PROCEDURE — 72148 MRI LUMBAR SPINE W/O DYE: CPT

## 2024-04-05 ENCOUNTER — TELEPHONE (OUTPATIENT)
Dept: ORTHOPEDIC SURGERY | Age: 54
End: 2024-04-05

## 2024-04-05 NOTE — TELEPHONE ENCOUNTER
----- Message from NORA Graham sent at 4/4/2024  3:40 PM EDT -----  Have patient see Dr. Trejo next week please.   ----- Message -----  From: Emmanuel Segovia Incoming Radiant Results From [employer]  Sent: 4/4/2024   3:17 PM EDT  To: NORA Graham

## 2024-04-09 ENCOUNTER — OFFICE VISIT (OUTPATIENT)
Dept: ORTHOPEDIC SURGERY | Age: 54
End: 2024-04-09
Payer: COMMERCIAL

## 2024-04-09 VITALS — HEIGHT: 72 IN | RESPIRATION RATE: 16 BRPM | BODY MASS INDEX: 42.66 KG/M2 | WEIGHT: 315 LBS

## 2024-04-09 DIAGNOSIS — M54.41 ACUTE RIGHT-SIDED LOW BACK PAIN WITH RIGHT-SIDED SCIATICA: Primary | ICD-10-CM

## 2024-04-09 DIAGNOSIS — M47.817 FACET ARTHRITIS OF LUMBOSACRAL REGION: ICD-10-CM

## 2024-04-09 PROCEDURE — 99213 OFFICE O/P EST LOW 20 MIN: CPT | Performed by: ORTHOPAEDIC SURGERY

## 2024-04-09 PROCEDURE — G8427 DOCREV CUR MEDS BY ELIG CLIN: HCPCS | Performed by: ORTHOPAEDIC SURGERY

## 2024-04-09 PROCEDURE — G8417 CALC BMI ABV UP PARAM F/U: HCPCS | Performed by: ORTHOPAEDIC SURGERY

## 2024-04-09 PROCEDURE — 1036F TOBACCO NON-USER: CPT | Performed by: ORTHOPAEDIC SURGERY

## 2024-04-09 PROCEDURE — 3017F COLORECTAL CA SCREEN DOC REV: CPT | Performed by: ORTHOPAEDIC SURGERY

## 2024-04-11 ENCOUNTER — PATIENT MESSAGE (OUTPATIENT)
Dept: ORTHOPEDIC SURGERY | Age: 54
End: 2024-04-11

## 2024-04-12 NOTE — TELEPHONE ENCOUNTER
From: Jarad Yo  To: Dr. Tarik Trejo  Sent: 4/11/2024 8:44 PM EDT  Subject: Lab test results     I had the lab work completed at [facility] and the results are back. Unsure what I needed to do for you to have access. Generally Dr. De Oliveira can view the results. Please contact me with further instructions.     Jarad Yo

## 2024-04-15 ENCOUNTER — TELEPHONE (OUTPATIENT)
Dept: ORTHOPEDIC SURGERY | Age: 54
End: 2024-04-15

## 2024-04-15 NOTE — TELEPHONE ENCOUNTER
----- Message from Jarad Yo sent at 4/12/2024  5:49 PM EDT -----  Regarding: Lab test results   Contact: [phone]  The labs results are available to you now in MyChart.    Jarad

## 2024-04-15 NOTE — TELEPHONE ENCOUNTER
Called pt and informed him we still dont have access to the lab test results, it looks like the one might not have even been done.  Pt said he would look into it.

## 2024-04-17 NOTE — TELEPHONE ENCOUNTER
The patient provided his labs printed to [facility] office. Scanned into media. Reviewed with Dr. Trejo. Dr. Trejo recommended the patient be evaluated on Thursday 4/18. Scheduled patient at 8:00 at [place].

## 2024-04-18 ENCOUNTER — OFFICE VISIT (OUTPATIENT)
Dept: ORTHOPEDIC SURGERY | Age: 54
End: 2024-04-18
Payer: COMMERCIAL

## 2024-04-18 VITALS — WEIGHT: 315 LBS | BODY MASS INDEX: 42.66 KG/M2 | RESPIRATION RATE: 16 BRPM | HEIGHT: 72 IN

## 2024-04-18 DIAGNOSIS — M47.817 FACET ARTHRITIS OF LUMBOSACRAL REGION: Primary | ICD-10-CM

## 2024-04-18 PROCEDURE — G8417 CALC BMI ABV UP PARAM F/U: HCPCS | Performed by: ORTHOPAEDIC SURGERY

## 2024-04-18 PROCEDURE — 99213 OFFICE O/P EST LOW 20 MIN: CPT | Performed by: ORTHOPAEDIC SURGERY

## 2024-04-18 PROCEDURE — 1036F TOBACCO NON-USER: CPT | Performed by: ORTHOPAEDIC SURGERY

## 2024-04-18 PROCEDURE — G8427 DOCREV CUR MEDS BY ELIG CLIN: HCPCS | Performed by: ORTHOPAEDIC SURGERY

## 2024-04-18 PROCEDURE — 3017F COLORECTAL CA SCREEN DOC REV: CPT | Performed by: ORTHOPAEDIC SURGERY

## 2024-04-18 NOTE — PROGRESS NOTES
mouth once daily, Disp: 30 tablet, Rfl: 1    ibuprofen (ADVIL;MOTRIN) 800 MG tablet, take 1 tablet by mouth every 8 hours if needed, Disp: , Rfl:     Handicap Placard MISC, by Does not apply route Exp 1/2027, Disp: 1 each, Rfl: 0    albuterol (PROVENTIL) (2.5 MG/3ML) 0.083% nebulizer solution, Take 3 mLs by nebulization every 4 hours as needed for Wheezing or Shortness of Breath, Disp: 1 Package, Rfl: 2    fluticasone (FLONASE) 50 MCG/ACT nasal spray, 1 spray by Nasal route daily, Disp: 1 Bottle, Rfl: 3    fexofenadine (ALLEGRA) 180 MG tablet, Take 1 tablet by mouth daily, Disp: 30 tablet, Rfl: 5    propranolol (INDERAL) 10 MG tablet, Take 1 tablet by mouth 2 times daily, Disp: , Rfl:     Multiple Vitamins-Minerals (MULTIVITAMIN ADULT PO), Take 1 tablet by mouth, Disp: , Rfl:     azaTHIOprine (IMURAN) 50 MG tablet, Take 1 tablet by mouth 3 times daily (Patient not taking: Reported on 3/26/2024), Disp: , Rfl: 0  Allergies   Allergen Reactions    Acetaminophen Other (See Comments)     Patient states he has liver disease     Social History     Socioeconomic History    Marital status:      Spouse name: Not on file    Number of children: Not on file    Years of education: Not on file    Highest education level: Not on file   Occupational History    Not on file   Tobacco Use    Smoking status: Former     Current packs/day: 1.50     Average packs/day: 1.5 packs/day for 17.3 years (25.9 ttl pk-yrs)     Types: Cigarettes     Start date: 2007    Smokeless tobacco: Never   Vaping Use    Vaping Use: Never used   Substance and Sexual Activity    Alcohol use: No    Drug use: Never    Sexual activity: Yes     Partners: Female     Comment: has been with wife since 2017   Other Topics Concern    Not on file   Social History Narrative    Not on file     Social Determinants of Health     Financial Resource Strain: Low Risk  (1/3/2024)    Overall Financial Resource Strain (CARDIA)     Difficulty of Paying Living Expenses: Not

## 2024-04-22 DIAGNOSIS — M17.0 BILATERAL PRIMARY OSTEOARTHRITIS OF KNEE: ICD-10-CM

## 2024-04-22 RX ORDER — MELOXICAM 15 MG/1
TABLET ORAL
Qty: 30 TABLET | Refills: 1 | Status: SHIPPED | OUTPATIENT
Start: 2024-04-22

## 2024-04-22 NOTE — TELEPHONE ENCOUNTER
Jarad Yo is calling to request a refill on the following medication(s):    Medication Request:  Requested Prescriptions     Pending Prescriptions Disp Refills    meloxicam (MOBIC) 15 MG tablet [Pharmacy Med Name: MELOXICAM 15 MG TABLET] 30 tablet 1     Sig: take 1 tablet by mouth once daily       Last Visit Date (If Applicable):  3/26/2024    Next Visit Date:    7/3/2024

## 2024-04-24 ENCOUNTER — HOSPITAL ENCOUNTER (OUTPATIENT)
Dept: INTERVENTIONAL RADIOLOGY/VASCULAR | Age: 54
Discharge: HOME | End: 2024-04-26
Payer: COMMERCIAL

## 2024-04-24 VITALS
OXYGEN SATURATION: 96 % | TEMPERATURE: 97.2 F | SYSTOLIC BLOOD PRESSURE: 134 MMHG | BODY MASS INDEX: 42.66 KG/M2 | RESPIRATION RATE: 17 BRPM | DIASTOLIC BLOOD PRESSURE: 94 MMHG | HEART RATE: 72 BPM | WEIGHT: 315 LBS | HEIGHT: 72 IN

## 2024-04-24 DIAGNOSIS — M47.817 FACET ARTHRITIS OF LUMBOSACRAL REGION: ICD-10-CM

## 2024-04-24 LAB
INR PPP: 1.2
PARTIAL THROMBOPLASTIN TIME: 28.9 SEC (ref 23–36.5)
PLATELET # BLD AUTO: NORMAL K/UL (ref 138–453)
PLATELET, FLUORESCENCE: 127 K/UL (ref 138–453)
PLATELETS.RETICULATED NFR BLD AUTO: 5.5 % (ref 1.1–10.3)
PROTHROMBIN TIME: 14.6 SEC (ref 11.7–14.9)

## 2024-04-24 PROCEDURE — 87205 SMEAR GRAM STAIN: CPT

## 2024-04-24 PROCEDURE — 85610 PROTHROMBIN TIME: CPT

## 2024-04-24 PROCEDURE — 85730 THROMBOPLASTIN TIME PARTIAL: CPT

## 2024-04-24 PROCEDURE — 2580000003 HC RX 258: Performed by: PHYSICIAN ASSISTANT

## 2024-04-24 PROCEDURE — 87075 CULTR BACTERIA EXCEPT BLOOD: CPT

## 2024-04-24 PROCEDURE — 77003 FLUOROGUIDE FOR SPINE INJECT: CPT

## 2024-04-24 PROCEDURE — 85049 AUTOMATED PLATELET COUNT: CPT

## 2024-04-24 PROCEDURE — 36415 COLL VENOUS BLD VENIPUNCTURE: CPT

## 2024-04-24 PROCEDURE — 85055 RETICULATED PLATELET ASSAY: CPT

## 2024-04-24 PROCEDURE — 62267 INTERDISCAL PERQ ASPIR DX: CPT

## 2024-04-24 PROCEDURE — 87070 CULTURE OTHR SPECIMN AEROBIC: CPT

## 2024-04-24 RX ORDER — SODIUM CHLORIDE 9 MG/ML
INJECTION, SOLUTION INTRAVENOUS CONTINUOUS
Status: DISCONTINUED | OUTPATIENT
Start: 2024-04-24 | End: 2024-04-27 | Stop reason: HOSPADM

## 2024-04-24 RX ADMIN — SODIUM CHLORIDE: 9 INJECTION, SOLUTION INTRAVENOUS at 07:45

## 2024-04-24 ASSESSMENT — PAIN DESCRIPTION - ORIENTATION: ORIENTATION: RIGHT

## 2024-04-24 ASSESSMENT — PAIN DESCRIPTION - FREQUENCY: FREQUENCY: INTERMITTENT

## 2024-04-24 ASSESSMENT — PAIN DESCRIPTION - DESCRIPTORS: DESCRIPTORS: ACHING

## 2024-04-24 ASSESSMENT — PAIN - FUNCTIONAL ASSESSMENT: PAIN_FUNCTIONAL_ASSESSMENT: NONE - DENIES PAIN

## 2024-04-24 ASSESSMENT — PAIN SCALES - GENERAL: PAINLEVEL_OUTOF10: 3

## 2024-04-24 ASSESSMENT — PAIN DESCRIPTION - ONSET: ONSET: ON-GOING

## 2024-04-24 ASSESSMENT — PAIN DESCRIPTION - LOCATION: LOCATION: BACK

## 2024-04-24 NOTE — DISCHARGE INSTRUCTIONS
Liver Biopsy Discharge Instructions      Instructions:    Resume normal diet unless instructed otherwise by your doctor.    No strenuous activity for 48 hours.    Clean site with soap and water.    Biopsy report will be available within one week.    Follow-up visit.    No aspirin, alcohol, or arthritis medicine for 1 week.    Report any of the Following to your Physician    Temperature over 100 degrees.    Bleeding or hematoma (blood under the skin) at biopsy site.    Redness, swelling, tenderness/pain or drainage at biopsy site.    Trouble bleeding.    Persistent shoulder pain.    Chest pain.    Shortness of breath.    Please do not hesitate to ask if there are any further questions we can answer for you or anything else we can do to make you more comfortable.

## 2024-04-24 NOTE — PROGRESS NOTES
Pt arrives to room for right L3 L4 aspirate  KT RT and HH RT to bedside  Site prepped and draped  Dr Carey to bedside  Access obtained and scant amt of blood aspirated for specimen  Access removed and site covered with band aid  Tolerated well  Return to [facility]  Report to [facility]

## 2024-04-24 NOTE — BRIEF OP NOTE
Brief Postoperative Note    Jarad Yo  YOB: 1970  [id number]    Pre-operative Diagnosis: Right side facet joint inflammation    Post-operative Diagnosis: Same    Procedure: Right L3-4 facet joint aspiration    Anesthesia: Local    Surgeons/Assistants: MD Aurelio    Estimated Blood Loss: less than 50     Complications: None    Specimens: Was Obtained:    Findings: Successful L3-L4 facet joint aspiration.  Scant amount of bloody fluid aspirated.    Electronically signed by NORA De Souza on 4/24/2024 at 9:51 AM

## 2024-04-24 NOTE — H&P
4/29/22   Sara Gonsales MD   Handicap Placard MISC by Does not apply route Exp 1/2027 1/20/22   Ginna De Oliveira MD   albuterol (PROVENTIL) (2.5 MG/3ML) 0.083% nebulizer solution Take 3 mLs by nebulization every 4 hours as needed for Wheezing or Shortness of Breath 8/16/21 3/26/24  Ginna De Oliveira MD   fluticasone (FLONASE) 50 MCG/ACT nasal spray 1 spray by Nasal route daily 5/19/21   Monica Hutchison MD   fexofenadine (ALLEGRA) 180 MG tablet Take 1 tablet by mouth daily 5/19/21   Monica Hutchison MD   propranolol (INDERAL) 10 MG tablet Take 1 tablet by mouth 2 times daily 11/17/20   Sara Gonsales MD   Multiple Vitamins-Minerals (MULTIVITAMIN ADULT PO) Take 1 tablet by mouth    Sara Gonsales MD   azaTHIOprine (IMURAN) 50 MG tablet Take 1 tablet by mouth 3 times daily  Patient not taking: Reported on 3/26/2024 11/13/15   Sara Gonsales MD     Past Medical History    has a past medical history of Alcoholism /alcohol abuse, Allergic rhinitis, cause unspecified, Asthma, Cirrhosis with alcoholism (HCC), End-stage liver disease (HCC), Esophageal reflux, Essential hypertension, benign, Obesity, VIKKI on CPAP, and Portal hypertension with esophageal varices (HCC).  Past Surgical History   has a past surgical history that includes Upper gastrointestinal endoscopy (12/13/2016); chalazion excision (Right, 08/2017); and joint replacement (8/1/2019).  Social History   reports that he has quit smoking. His smoking use included cigarettes. He started smoking about 17 years ago. He has a 26.0 pack-year smoking history. He has never used smokeless tobacco.   reports no history of alcohol use.   reports no history of drug use.  Marital Status    Occupation    Family History  Family Status   Relation Name Status    Mother  (Not Specified)     family history includes Hypertension in his mother.  Review of Systems:  CONSTITUTIONAL:   negative for fevers, chills, fatigue and malaise    EYES:

## 2024-04-28 LAB
MICROORGANISM SPEC CULT: ABNORMAL
MICROORGANISM SPEC CULT: ABNORMAL
MICROORGANISM/AGENT SPEC: ABNORMAL
MICROORGANISM/AGENT SPEC: ABNORMAL
SPECIMEN DESCRIPTION: ABNORMAL

## 2024-04-30 ENCOUNTER — OFFICE VISIT (OUTPATIENT)
Dept: ORTHOPEDIC SURGERY | Age: 54
End: 2024-04-30
Payer: COMMERCIAL

## 2024-04-30 VITALS — RESPIRATION RATE: 16 BRPM | HEIGHT: 72 IN | WEIGHT: 315 LBS | BODY MASS INDEX: 42.66 KG/M2

## 2024-04-30 DIAGNOSIS — M54.41 ACUTE RIGHT-SIDED LOW BACK PAIN WITH RIGHT-SIDED SCIATICA: ICD-10-CM

## 2024-04-30 DIAGNOSIS — M00.9 JOINT INFECTION (HCC): Primary | ICD-10-CM

## 2024-04-30 DIAGNOSIS — M47.817 FACET ARTHRITIS OF LUMBOSACRAL REGION: ICD-10-CM

## 2024-04-30 PROCEDURE — G8417 CALC BMI ABV UP PARAM F/U: HCPCS | Performed by: ORTHOPAEDIC SURGERY

## 2024-04-30 PROCEDURE — 1036F TOBACCO NON-USER: CPT | Performed by: ORTHOPAEDIC SURGERY

## 2024-04-30 PROCEDURE — 3017F COLORECTAL CA SCREEN DOC REV: CPT | Performed by: ORTHOPAEDIC SURGERY

## 2024-04-30 PROCEDURE — G8427 DOCREV CUR MEDS BY ELIG CLIN: HCPCS | Performed by: ORTHOPAEDIC SURGERY

## 2024-04-30 PROCEDURE — 99213 OFFICE O/P EST LOW 20 MIN: CPT | Performed by: ORTHOPAEDIC SURGERY

## 2024-04-30 RX ORDER — SULFAMETHOXAZOLE AND TRIMETHOPRIM 800; 160 MG/1; MG/1
1 TABLET ORAL 2 TIMES DAILY
Qty: 56 TABLET | Refills: 0 | Status: SHIPPED | OUTPATIENT
Start: 2024-04-30 | End: 2024-05-28

## 2024-04-30 RX ORDER — CEPHALEXIN 500 MG/1
500 CAPSULE ORAL 4 TIMES DAILY
Qty: 112 CAPSULE | Refills: 0 | Status: SHIPPED | OUTPATIENT
Start: 2024-04-30 | End: 2024-05-28

## 2024-04-30 ASSESSMENT — ENCOUNTER SYMPTOMS: BACK PAIN: 1

## 2024-04-30 NOTE — PROGRESS NOTES
Patient ID: Jarad Yo is a 53 y.o. male.    Chief Complaint   Patient presents with    Back Pain     After IR aspiration and culture facet joint        Back Pain        Follow-up facet aspiration and culture    Patient grew out staph species coag negative rare growth    Patient reports intensity of pain is diminishing    Incidentally patient reports he had his tooth pulled last Thursday and is on amoxicillin    Past Medical History:   Diagnosis Date    Alcoholism /alcohol abuse 08/21/2014    Allergic rhinitis, cause unspecified 08/07/2014    Asthma Unknown    Cirrhosis with alcoholism (HCC) 08/21/2014    End-stage liver disease (HCC) 08/21/2014    Esophageal reflux 08/07/2014    Essential hypertension, benign 08/07/2014    Obesity 1/1/1990    Long time but trying to lose    VIKKI on CPAP     Portal hypertension with esophageal varices (HCC) 08/21/2014     Past Surgical History:   Procedure Laterality Date    CHALAZION EXCISION Right 08/2017    IR PERC ASPIRATION INTERVERT DISC  4/24/2024    IR PERC ASPIRATION INTERVERT DISC 4/24/2024 Mark Carey MD [facility]    JOINT REPLACEMENT  8/1/2019    Knee    UPPER GASTROINTESTINAL ENDOSCOPY  12/13/2016     Family History   Problem Relation Age of Onset    Hypertension Mother      Social History     Occupational History    Not on file   Tobacco Use    Smoking status: Former     Current packs/day: 1.50     Average packs/day: 1.5 packs/day for 17.3 years (26.0 ttl pk-yrs)     Types: Cigarettes     Start date: 2007    Smokeless tobacco: Never   Vaping Use    Vaping Use: Never used   Substance and Sexual Activity    Alcohol use: No    Drug use: Never    Sexual activity: Yes     Partners: Female     Comment: has been with wife since 2017        Review of Systems   Musculoskeletal:  Positive for back pain.            Physical Exam  Vitals and nursing note reviewed.   Constitutional:       Appearance: He is well-developed.   HENT:      Head:

## 2024-05-07 ENCOUNTER — OFFICE VISIT (OUTPATIENT)
Dept: ORTHOPEDIC SURGERY | Age: 54
End: 2024-05-07
Payer: COMMERCIAL

## 2024-05-07 VITALS — HEIGHT: 72 IN | BODY MASS INDEX: 42.66 KG/M2 | RESPIRATION RATE: 16 BRPM | WEIGHT: 315 LBS

## 2024-05-07 DIAGNOSIS — M47.817 FACET ARTHRITIS OF LUMBOSACRAL REGION: ICD-10-CM

## 2024-05-07 DIAGNOSIS — M00.9 JOINT INFECTION (HCC): Primary | ICD-10-CM

## 2024-05-07 PROCEDURE — 3017F COLORECTAL CA SCREEN DOC REV: CPT | Performed by: ORTHOPAEDIC SURGERY

## 2024-05-07 PROCEDURE — G8417 CALC BMI ABV UP PARAM F/U: HCPCS | Performed by: ORTHOPAEDIC SURGERY

## 2024-05-07 PROCEDURE — 99213 OFFICE O/P EST LOW 20 MIN: CPT | Performed by: ORTHOPAEDIC SURGERY

## 2024-05-07 PROCEDURE — G8427 DOCREV CUR MEDS BY ELIG CLIN: HCPCS | Performed by: ORTHOPAEDIC SURGERY

## 2024-05-07 PROCEDURE — 1036F TOBACCO NON-USER: CPT | Performed by: ORTHOPAEDIC SURGERY

## 2024-05-07 NOTE — PROGRESS NOTES
Patient ID: Jarad Yo is a 53 y.o. male    Chief Compliant:  Chief Complaint   Patient presents with    Lower Back Pain        Diagnostic imaging:    CBC prior had been normal with a mildly elevated C-reactive protein and a sed rate of 92    Culture from aspiration of the facet joint joint showed coag negative staph    Assessment and Plan:  1. Facet arthritis of lumbosacral region    2. Joint infection (HCC)        Right sided low back pain radiating into the right buttock and anterior leg  Potential/likely facet joint injection at this time    Continue antibiotics    Repeat CBC sed rate C-reactive protein        Repeat labs    Follow up 1 week    HPI:  This is a 53 y.o. male who presents to the clinic today for right sided low back pain radiating into the right buttock and anterior leg.     Patient states that his discomfort is beginning to present more to the right side rather than midline.     Review of Systems   All other systems reviewed and are negative.      Past History:    Current Outpatient Medications:     sulfamethoxazole-trimethoprim (BACTRIM DS;SEPTRA DS) 800-160 MG per tablet, Take 1 tablet by mouth 2 times daily for 56 doses Take with food., Disp: 56 tablet, Rfl: 0    cephALEXin (KEFLEX) 500 MG capsule, Take 1 capsule by mouth 4 times daily for 112 doses, Disp: 112 capsule, Rfl: 0    meloxicam (MOBIC) 15 MG tablet, take 1 tablet by mouth once daily, Disp: 30 tablet, Rfl: 1    omeprazole (PRILOSEC) 40 MG delayed release capsule, Take 1 capsule by mouth every morning, Disp: , Rfl:     metoprolol tartrate (LOPRESSOR) 50 MG tablet, Take by mouth (Patient not taking: Reported on 4/24/2024), Disp: , Rfl:     dapagliflozin (FARXIGA) 10 MG tablet, Take 1 tablet by mouth (Patient not taking: Reported on 3/26/2024), Disp: , Rfl:     bumetanide (BUMEX) 0.5 MG tablet, Take 1 tablet by mouth, Disp: , Rfl:     ADVAIR -21 MCG/ACT inhaler, inhale 2 puffs by mouth and INTO THE LUNGS twice a day, Disp: 12 g,

## 2024-05-10 ENCOUNTER — HOSPITAL ENCOUNTER (OUTPATIENT)
Age: 54
Discharge: HOME | End: 2024-05-10
Payer: COMMERCIAL

## 2024-05-10 DIAGNOSIS — M00.9 JOINT INFECTION (HCC): ICD-10-CM

## 2024-05-10 LAB
CRP SERPL HS-MCNC: <3 MG/L (ref 0–5)
ERYTHROCYTE [DISTWIDTH] IN BLOOD BY AUTOMATED COUNT: 14.9 % (ref 11.8–14.4)
ERYTHROCYTE [SEDIMENTATION RATE] IN BLOOD BY PHOTOMETRIC METHOD: 30 MM/HR (ref 0–20)
HCT VFR BLD AUTO: 45.8 % (ref 40.7–50.3)
HGB BLD-MCNC: 15.3 G/DL (ref 13–17)
MCH RBC QN AUTO: 33.7 PG (ref 25.2–33.5)
MCHC RBC AUTO-ENTMCNC: 33.4 G/DL (ref 28.4–34.8)
MCV RBC AUTO: 100.9 FL (ref 82.6–102.9)
NRBC BLD-RTO: 0 PER 100 WBC
PLATELET # BLD AUTO: 165 K/UL (ref 138–453)
PMV BLD AUTO: 10.8 FL (ref 8.1–13.5)
RBC # BLD AUTO: 4.54 M/UL (ref 4.21–5.77)
WBC OTHER # BLD: 5.4 K/UL (ref 3.5–11.3)

## 2024-05-10 PROCEDURE — 36415 COLL VENOUS BLD VENIPUNCTURE: CPT

## 2024-05-10 PROCEDURE — 85027 COMPLETE CBC AUTOMATED: CPT

## 2024-05-10 PROCEDURE — 86140 C-REACTIVE PROTEIN: CPT

## 2024-05-10 PROCEDURE — 85652 RBC SED RATE AUTOMATED: CPT

## 2024-05-14 ENCOUNTER — OFFICE VISIT (OUTPATIENT)
Dept: ORTHOPEDIC SURGERY | Age: 54
End: 2024-05-14
Payer: COMMERCIAL

## 2024-05-14 VITALS — RESPIRATION RATE: 16 BRPM | HEIGHT: 72 IN | BODY MASS INDEX: 42.66 KG/M2 | WEIGHT: 315 LBS

## 2024-05-14 DIAGNOSIS — M47.817 FACET ARTHRITIS OF LUMBOSACRAL REGION: Primary | ICD-10-CM

## 2024-05-14 DIAGNOSIS — M00.9 JOINT INFECTION (HCC): ICD-10-CM

## 2024-05-14 PROCEDURE — 3017F COLORECTAL CA SCREEN DOC REV: CPT | Performed by: ORTHOPAEDIC SURGERY

## 2024-05-14 PROCEDURE — 1036F TOBACCO NON-USER: CPT | Performed by: ORTHOPAEDIC SURGERY

## 2024-05-14 PROCEDURE — 99213 OFFICE O/P EST LOW 20 MIN: CPT | Performed by: ORTHOPAEDIC SURGERY

## 2024-05-14 PROCEDURE — G8427 DOCREV CUR MEDS BY ELIG CLIN: HCPCS | Performed by: ORTHOPAEDIC SURGERY

## 2024-05-14 PROCEDURE — G8417 CALC BMI ABV UP PARAM F/U: HCPCS | Performed by: ORTHOPAEDIC SURGERY

## 2024-05-14 NOTE — PROGRESS NOTES
Patient ID: Jarad Yo is a 53 y.o. male    Chief Compliant:  Chief Complaint   Patient presents with    Lower Back Pain     Going over lab results         Diagnostic imaging:  C-reactive protein is now down to normal    Sed rate has diminished from 92-30    WBC 5.4      Assessment and Plan:  1. Facet arthritis of lumbosacral region    2. Joint infection (HCC)        Right sided low back pain radiating into the right buttock and anterior leg.    Pain had gone from severe to more of an ache and remains there    Continue anti-biotics.    Follow up 3 weeks    HPI:  This is a 53 y.o. male who presents to the clinic today for follow up of right sided low back pain radiating into the right buttock and anterior leg.     Patient reports that his pain is getting better overall but he continues to experience discomfort especially with walking.    He reports that he is tolerating the anti-biotics.    Review of Systems   All other systems reviewed and are negative.      Past History:    Current Outpatient Medications:     sulfamethoxazole-trimethoprim (BACTRIM DS;SEPTRA DS) 800-160 MG per tablet, Take 1 tablet by mouth 2 times daily for 56 doses Take with food., Disp: 56 tablet, Rfl: 0    cephALEXin (KEFLEX) 500 MG capsule, Take 1 capsule by mouth 4 times daily for 112 doses, Disp: 112 capsule, Rfl: 0    meloxicam (MOBIC) 15 MG tablet, take 1 tablet by mouth once daily, Disp: 30 tablet, Rfl: 1    omeprazole (PRILOSEC) 40 MG delayed release capsule, Take 1 capsule by mouth every morning, Disp: , Rfl:     metoprolol tartrate (LOPRESSOR) 50 MG tablet, Take by mouth (Patient not taking: Reported on 4/24/2024), Disp: , Rfl:     dapagliflozin (FARXIGA) 10 MG tablet, Take 1 tablet by mouth (Patient not taking: Reported on 3/26/2024), Disp: , Rfl:     bumetanide (BUMEX) 0.5 MG tablet, Take 1 tablet by mouth, Disp: , Rfl:     ADVAIR -21 MCG/ACT inhaler, inhale 2 puffs by mouth and INTO THE LUNGS twice a day, Disp: 12 g, Rfl:

## 2024-05-29 ENCOUNTER — PATIENT MESSAGE (OUTPATIENT)
Dept: ORTHOPEDIC SURGERY | Age: 54
End: 2024-05-29

## 2024-05-30 ENCOUNTER — TELEPHONE (OUTPATIENT)
Dept: ORTHOPEDIC SURGERY | Age: 54
End: 2024-05-30

## 2024-05-30 NOTE — TELEPHONE ENCOUNTER
----- Message from Jarad Yo sent at 5/29/2024  8:29 AM EDT -----  Regarding: Antiboitics currently takin  Contact: [phone]  Dr. Trejo I am nearing the end of my current prescription of these antibiotics you have me taking.  I know you metion that you wanted me to continue, but they do not have refills.  Can you please send prescription to [facility] on [address] ?  I want to not miss doses, thank  y ou.      Jarad Yo

## 2024-05-30 NOTE — TELEPHONE ENCOUNTER
Patient called for refills on medications  sulfamethoxazole-trimethoprim (BACTRIM DS;SEPTRA DS) 800-160 MG per tablet [[id number]]  ENDED     cephALEXin (KEFLEX) 500 MG capsule [[id number]]  ENDED     Please advise  Thank you

## 2024-05-30 NOTE — TELEPHONE ENCOUNTER
From: Jarad Yo  To: Dr. Tarik Trejo  Sent: 5/29/2024 8:29 AM EDT  Subject: Antiboitics currently takin    Dr. Trejo I am nearing the end of my current prescription of these antibiotics you have me taking. I know you metion that you wanted me to continue, but they do not have refills. Can you please send prescription to [facility] on [address] ? I want to not miss doses, thank y ou.     Jarad Yo

## 2024-05-30 NOTE — TELEPHONE ENCOUNTER
Pt informed via answering machine to continue both antibiotics till they were both gone but he doesn't need refills.

## 2024-06-04 ENCOUNTER — OFFICE VISIT (OUTPATIENT)
Dept: ORTHOPEDIC SURGERY | Age: 54
End: 2024-06-04
Payer: COMMERCIAL

## 2024-06-04 VITALS — RESPIRATION RATE: 16 BRPM | HEIGHT: 72 IN | BODY MASS INDEX: 42.66 KG/M2 | WEIGHT: 315 LBS

## 2024-06-04 DIAGNOSIS — M54.41 ACUTE RIGHT-SIDED LOW BACK PAIN WITH RIGHT-SIDED SCIATICA: ICD-10-CM

## 2024-06-04 DIAGNOSIS — M47.817 FACET ARTHRITIS OF LUMBOSACRAL REGION: Primary | ICD-10-CM

## 2024-06-04 DIAGNOSIS — M00.9 JOINT INFECTION (HCC): ICD-10-CM

## 2024-06-04 PROCEDURE — G8427 DOCREV CUR MEDS BY ELIG CLIN: HCPCS | Performed by: ORTHOPAEDIC SURGERY

## 2024-06-04 PROCEDURE — 99213 OFFICE O/P EST LOW 20 MIN: CPT | Performed by: ORTHOPAEDIC SURGERY

## 2024-06-04 PROCEDURE — G8417 CALC BMI ABV UP PARAM F/U: HCPCS | Performed by: ORTHOPAEDIC SURGERY

## 2024-06-04 PROCEDURE — 3017F COLORECTAL CA SCREEN DOC REV: CPT | Performed by: ORTHOPAEDIC SURGERY

## 2024-06-04 PROCEDURE — 1036F TOBACCO NON-USER: CPT | Performed by: ORTHOPAEDIC SURGERY

## 2024-06-04 RX ORDER — SULFAMETHOXAZOLE AND TRIMETHOPRIM 800; 160 MG/1; MG/1
1 TABLET ORAL 2 TIMES DAILY
Qty: 28 TABLET | Refills: 0 | Status: SHIPPED | OUTPATIENT
Start: 2024-06-04 | End: 2024-07-02

## 2024-06-04 RX ORDER — CEPHALEXIN 500 MG/1
500 CAPSULE ORAL 4 TIMES DAILY
Qty: 56 CAPSULE | Refills: 0 | Status: SHIPPED | OUTPATIENT
Start: 2024-06-04 | End: 2024-06-18

## 2024-06-04 NOTE — PROGRESS NOTES
Food in the Last Year: Never true     Ran Out of Food in the Last Year: Never true   Transportation Needs: Unknown (1/3/2024)    PRAPARE - Transportation     Lack of Transportation (Medical): Not on file     Lack of Transportation (Non-Medical): No   Physical Activity: Sufficiently Active (3/19/2024)    Exercise Vital Sign     Days of Exercise per Week: 3 days     Minutes of Exercise per Session: 60 min   Stress: Not on file   Social Connections: Not on file   Intimate Partner Violence: Not on file   Housing Stability: Unknown (1/3/2024)    Housing Stability Vital Sign     Unable to Pay for Housing in the Last Year: Not on file     Number of Places Lived in the Last Year: Not on file     Unstable Housing in the Last Year: No     Past Medical History:   Diagnosis Date    Alcoholism /alcohol abuse 08/21/2014    Allergic rhinitis, cause unspecified 08/07/2014    Asthma Unknown    Cirrhosis with alcoholism (HCC) 08/21/2014    End-stage liver disease (HCC) 08/21/2014    Esophageal reflux 08/07/2014    Essential hypertension, benign 08/07/2014    Obesity 1/1/1990    Long time but trying to lose    VIKKI on CPAP     Portal hypertension with esophageal varices (HCC) 08/21/2014     Past Surgical History:   Procedure Laterality Date    CHALAZION EXCISION Right 08/2017    IR PERC ASPIRATION INTERVERT DISC  4/24/2024    IR PERC ASPIRATION INTERVERT DISC 4/24/2024 Mark Carey MD [facility]    JOINT REPLACEMENT  8/1/2019    Knee    UPPER GASTROINTESTINAL ENDOSCOPY  12/13/2016     Family History   Problem Relation Age of Onset    Hypertension Mother         Physical Exam:  Vitals signs and nursing note reviewed.   Constitutional:       Appearance: well-developed.   HENT:      Head: Normocephalic and atraumatic.      Nose: Nose normal.   Eyes:      Conjunctiva/sclera: Conjunctivae normal.   Neck:      Musculoskeletal: Normal range of motion and neck supple.   Pulmonary:      Effort: Pulmonary effort is normal.

## 2024-06-24 DIAGNOSIS — M17.0 BILATERAL PRIMARY OSTEOARTHRITIS OF KNEE: ICD-10-CM

## 2024-06-24 RX ORDER — MELOXICAM 15 MG/1
TABLET ORAL
Qty: 30 TABLET | Refills: 1 | Status: SHIPPED | OUTPATIENT
Start: 2024-06-24

## 2024-06-25 ENCOUNTER — OFFICE VISIT (OUTPATIENT)
Dept: ORTHOPEDIC SURGERY | Age: 54
End: 2024-06-25
Payer: COMMERCIAL

## 2024-06-25 VITALS — WEIGHT: 300 LBS | HEIGHT: 72 IN | BODY MASS INDEX: 40.63 KG/M2 | RESPIRATION RATE: 16 BRPM

## 2024-06-25 DIAGNOSIS — M00.9 JOINT INFECTION (HCC): ICD-10-CM

## 2024-06-25 DIAGNOSIS — M47.817 FACET ARTHRITIS OF LUMBOSACRAL REGION: Primary | ICD-10-CM

## 2024-06-25 PROCEDURE — 1036F TOBACCO NON-USER: CPT | Performed by: ORTHOPAEDIC SURGERY

## 2024-06-25 PROCEDURE — G8427 DOCREV CUR MEDS BY ELIG CLIN: HCPCS | Performed by: ORTHOPAEDIC SURGERY

## 2024-06-25 PROCEDURE — G8417 CALC BMI ABV UP PARAM F/U: HCPCS | Performed by: ORTHOPAEDIC SURGERY

## 2024-06-25 PROCEDURE — 99213 OFFICE O/P EST LOW 20 MIN: CPT | Performed by: ORTHOPAEDIC SURGERY

## 2024-06-25 PROCEDURE — 3017F COLORECTAL CA SCREEN DOC REV: CPT | Performed by: ORTHOPAEDIC SURGERY

## 2024-06-25 NOTE — PROGRESS NOTES
Patient ID: Jarad Yo is a 53 y.o. male    Chief Compliant:  Chief Complaint   Patient presents with    Lower Back Pain     Facet arthritis of lumbosacral region and joint infection        Diagnostic imaging:        Assessment and Plan:  1. Facet arthritis of lumbosacral region    2. Joint infection (HCC)            Patient reports that his low back pain continues to improve with the anti-biotics.     Patient finished his antibiotics his most recent lab work revealed a return to normal sed rate C-reactive protein and WBCs    Follow up 6 weeks    HPI:  This is a 53 y.o. male who presents to the clinic today for follow up of right sided low back pain radiating into the right buttock and anterior leg.     Patient reports that his low back pain continues to improve.    Review of Systems   All other systems reviewed and are negative.      Past History:    Current Outpatient Medications:     meloxicam (MOBIC) 15 MG tablet, take 1 tablet by mouth once daily, Disp: 30 tablet, Rfl: 1    sulfamethoxazole-trimethoprim (BACTRIM DS;SEPTRA DS) 800-160 MG per tablet, Take 1 tablet by mouth 2 times daily for 28 days Take with food., Disp: 28 tablet, Rfl: 0    omeprazole (PRILOSEC) 40 MG delayed release capsule, Take 1 capsule by mouth every morning, Disp: , Rfl:     metoprolol tartrate (LOPRESSOR) 50 MG tablet, Take by mouth (Patient not taking: Reported on 4/24/2024), Disp: , Rfl:     dapagliflozin (FARXIGA) 10 MG tablet, Take 1 tablet by mouth (Patient not taking: Reported on 3/26/2024), Disp: , Rfl:     bumetanide (BUMEX) 0.5 MG tablet, Take 1 tablet by mouth, Disp: , Rfl:     ADVAIR -21 MCG/ACT inhaler, inhale 2 puffs by mouth and INTO THE LUNGS twice a day, Disp: 12 g, Rfl: 1    Cholecalciferol (VITAMIN D3) 50 MCG (2000 UT) TABS, Take 1 tablet by mouth daily, Disp: 30 tablet, Rfl: 5    albuterol sulfate HFA (VENTOLIN HFA) 108 (90 Base) MCG/ACT inhaler, inhale 2 puffs by mouth every 6 hours if needed for wheezing,

## 2024-06-26 ENCOUNTER — HOSPITAL ENCOUNTER (OUTPATIENT)
Age: 54
Discharge: HOME | End: 2024-06-26
Payer: COMMERCIAL

## 2024-06-26 DIAGNOSIS — M54.41 ACUTE RIGHT-SIDED LOW BACK PAIN WITH RIGHT-SIDED SCIATICA: ICD-10-CM

## 2024-06-26 DIAGNOSIS — N52.9 ERECTILE DYSFUNCTION, UNSPECIFIED ERECTILE DYSFUNCTION TYPE: ICD-10-CM

## 2024-06-26 DIAGNOSIS — Z13.1 DIABETES MELLITUS SCREENING: ICD-10-CM

## 2024-06-26 DIAGNOSIS — E55.9 VITAMIN D DEFICIENCY: ICD-10-CM

## 2024-06-26 DIAGNOSIS — M47.817 FACET ARTHRITIS OF LUMBOSACRAL REGION: ICD-10-CM

## 2024-06-26 DIAGNOSIS — Z13.220 LIPID SCREENING: ICD-10-CM

## 2024-06-26 DIAGNOSIS — Z12.5 SCREENING FOR PROSTATE CANCER: ICD-10-CM

## 2024-06-26 LAB
25(OH)D3 SERPL-MCNC: 30.9 NG/ML (ref 30–100)
ALBUMIN SERPL-MCNC: 3.7 G/DL (ref 3.5–5.2)
ALBUMIN/GLOB SERPL: 1 {RATIO} (ref 1–2.5)
ALP SERPL-CCNC: 109 U/L (ref 40–129)
ALT SERPL-CCNC: 13 U/L (ref 10–50)
ANION GAP SERPL CALCULATED.3IONS-SCNC: 8 MMOL/L (ref 9–16)
AST SERPL-CCNC: 30 U/L (ref 10–50)
BASOPHILS # BLD: 0.09 K/UL (ref 0–0.2)
BASOPHILS NFR BLD: 2 % (ref 0–2)
BILIRUB SERPL-MCNC: 0.5 MG/DL (ref 0–1.2)
BUN SERPL-MCNC: 15 MG/DL (ref 6–20)
CALCIUM SERPL-MCNC: 8.7 MG/DL (ref 8.6–10.4)
CHLORIDE SERPL-SCNC: 109 MMOL/L (ref 98–107)
CHOLEST SERPL-MCNC: 133 MG/DL (ref 0–199)
CHOLESTEROL/HDL RATIO: 3
CO2 SERPL-SCNC: 24 MMOL/L (ref 20–31)
CREAT SERPL-MCNC: 0.8 MG/DL (ref 0.7–1.2)
EOSINOPHIL # BLD: 0.25 K/UL (ref 0–0.44)
EOSINOPHILS RELATIVE PERCENT: 5 % (ref 1–4)
ERYTHROCYTE [DISTWIDTH] IN BLOOD BY AUTOMATED COUNT: 15.1 % (ref 11.8–14.4)
ERYTHROCYTE [SEDIMENTATION RATE] IN BLOOD BY PHOTOMETRIC METHOD: 21 MM/HR (ref 0–20)
EST. AVERAGE GLUCOSE BLD GHB EST-MCNC: 103 MG/DL
GFR, ESTIMATED: >90 ML/MIN/1.73M2
GLUCOSE P FAST SERPL-MCNC: 95 MG/DL (ref 74–99)
HBA1C MFR BLD: 5.2 % (ref 4–6)
HCT VFR BLD AUTO: 45.3 % (ref 40.7–50.3)
HDLC SERPL-MCNC: 51 MG/DL
HGB BLD-MCNC: 15.2 G/DL (ref 13–17)
IMM GRANULOCYTES # BLD AUTO: <0.03 K/UL (ref 0–0.3)
IMM GRANULOCYTES NFR BLD: 0 %
LDLC SERPL CALC-MCNC: 63 MG/DL (ref 0–100)
LYMPHOCYTES NFR BLD: 1.85 K/UL (ref 1.1–3.7)
LYMPHOCYTES RELATIVE PERCENT: 37 % (ref 24–43)
MCH RBC QN AUTO: 33.6 PG (ref 25.2–33.5)
MCHC RBC AUTO-ENTMCNC: 33.6 G/DL (ref 28.4–34.8)
MCV RBC AUTO: 100 FL (ref 82.6–102.9)
MONOCYTES NFR BLD: 0.51 K/UL (ref 0.1–1.2)
MONOCYTES NFR BLD: 10 % (ref 3–12)
NEUTROPHILS NFR BLD: 46 % (ref 36–65)
NEUTS SEG NFR BLD: 2.29 K/UL (ref 1.5–8.1)
NRBC BLD-RTO: 0 PER 100 WBC
PLATELET # BLD AUTO: 124 K/UL (ref 138–453)
PMV BLD AUTO: 10.8 FL (ref 8.1–13.5)
POTASSIUM SERPL-SCNC: 4 MMOL/L (ref 3.7–5.3)
PROT SERPL-MCNC: 7 G/DL (ref 6.6–8.7)
PSA SERPL-MCNC: 0.4 NG/ML (ref 0–4)
RBC # BLD AUTO: 4.53 M/UL (ref 4.21–5.77)
RBC # BLD: ABNORMAL 10*6/UL
SHBG SERPL-SCNC: 79 NMOL/L (ref 19–76)
SODIUM SERPL-SCNC: 141 MMOL/L (ref 136–145)
TESTOST FREE MFR SERPL: 69.3 PG/ML (ref 47–244)
TESTOST SERPL-MCNC: 604 NG/DL (ref 193–740)
TESTOSTERONE, BIOAVAILABLE: 162.4 NG/DL (ref 130–680)
TRIGL SERPL-MCNC: 91 MG/DL (ref 0–149)
TSH SERPL DL<=0.05 MIU/L-ACNC: 1.43 UIU/ML (ref 0.27–4.2)
VLDLC SERPL CALC-MCNC: 18 MG/DL
WBC OTHER # BLD: 5 K/UL (ref 3.5–11.3)

## 2024-06-26 PROCEDURE — 85652 RBC SED RATE AUTOMATED: CPT

## 2024-06-26 PROCEDURE — G0103 PSA SCREENING: HCPCS

## 2024-06-26 PROCEDURE — 36415 COLL VENOUS BLD VENIPUNCTURE: CPT

## 2024-06-26 PROCEDURE — 80053 COMPREHEN METABOLIC PANEL: CPT

## 2024-06-26 PROCEDURE — 84270 ASSAY OF SEX HORMONE GLOBUL: CPT

## 2024-06-26 PROCEDURE — 84403 ASSAY OF TOTAL TESTOSTERONE: CPT

## 2024-06-26 PROCEDURE — 84443 ASSAY THYROID STIM HORMONE: CPT

## 2024-06-26 PROCEDURE — 85025 COMPLETE CBC W/AUTO DIFF WBC: CPT

## 2024-06-26 PROCEDURE — 82306 VITAMIN D 25 HYDROXY: CPT

## 2024-06-26 PROCEDURE — 83036 HEMOGLOBIN GLYCOSYLATED A1C: CPT

## 2024-06-26 PROCEDURE — 80061 LIPID PANEL: CPT

## 2024-07-03 ENCOUNTER — OFFICE VISIT (OUTPATIENT)
Dept: FAMILY MEDICINE CLINIC | Age: 54
End: 2024-07-03
Payer: COMMERCIAL

## 2024-07-03 VITALS
DIASTOLIC BLOOD PRESSURE: 68 MMHG | HEART RATE: 64 BPM | TEMPERATURE: 97 F | BODY MASS INDEX: 42.66 KG/M2 | OXYGEN SATURATION: 97 % | WEIGHT: 315 LBS | HEIGHT: 72 IN | SYSTOLIC BLOOD PRESSURE: 108 MMHG

## 2024-07-03 DIAGNOSIS — I20.9 ANGINA PECTORIS (HCC): ICD-10-CM

## 2024-07-03 DIAGNOSIS — G47.33 OSA ON CPAP: Primary | ICD-10-CM

## 2024-07-03 PROCEDURE — 99214 OFFICE O/P EST MOD 30 MIN: CPT | Performed by: STUDENT IN AN ORGANIZED HEALTH CARE EDUCATION/TRAINING PROGRAM

## 2024-07-03 PROCEDURE — G8417 CALC BMI ABV UP PARAM F/U: HCPCS | Performed by: STUDENT IN AN ORGANIZED HEALTH CARE EDUCATION/TRAINING PROGRAM

## 2024-07-03 PROCEDURE — 3017F COLORECTAL CA SCREEN DOC REV: CPT | Performed by: STUDENT IN AN ORGANIZED HEALTH CARE EDUCATION/TRAINING PROGRAM

## 2024-07-03 PROCEDURE — G8427 DOCREV CUR MEDS BY ELIG CLIN: HCPCS | Performed by: STUDENT IN AN ORGANIZED HEALTH CARE EDUCATION/TRAINING PROGRAM

## 2024-07-03 PROCEDURE — 1036F TOBACCO NON-USER: CPT | Performed by: STUDENT IN AN ORGANIZED HEALTH CARE EDUCATION/TRAINING PROGRAM

## 2024-07-03 SDOH — ECONOMIC STABILITY: FOOD INSECURITY: WITHIN THE PAST 12 MONTHS, THE FOOD YOU BOUGHT JUST DIDN'T LAST AND YOU DIDN'T HAVE MONEY TO GET MORE.: NEVER TRUE

## 2024-07-03 SDOH — ECONOMIC STABILITY: TRANSPORTATION INSECURITY
IN THE PAST 12 MONTHS, HAS LACK OF TRANSPORTATION KEPT YOU FROM MEETINGS, WORK, OR FROM GETTING THINGS NEEDED FOR DAILY LIVING?: NO

## 2024-07-03 SDOH — ECONOMIC STABILITY: INCOME INSECURITY: IN THE LAST 12 MONTHS, WAS THERE A TIME WHEN YOU WERE NOT ABLE TO PAY THE MORTGAGE OR RENT ON TIME?: NO

## 2024-07-03 SDOH — ECONOMIC STABILITY: FOOD INSECURITY: WITHIN THE PAST 12 MONTHS, YOU WORRIED THAT YOUR FOOD WOULD RUN OUT BEFORE YOU GOT MONEY TO BUY MORE.: NEVER TRUE

## 2024-07-03 SDOH — ECONOMIC STABILITY: TRANSPORTATION INSECURITY
IN THE PAST 12 MONTHS, HAS THE LACK OF TRANSPORTATION KEPT YOU FROM MEDICAL APPOINTMENTS OR FROM GETTING MEDICATIONS?: NO

## 2024-07-03 ASSESSMENT — PATIENT HEALTH QUESTIONNAIRE - PHQ9
SUM OF ALL RESPONSES TO PHQ QUESTIONS 1-9: 0
SUM OF ALL RESPONSES TO PHQ QUESTIONS 1-9: 0
1. LITTLE INTEREST OR PLEASURE IN DOING THINGS: NOT AT ALL
SUM OF ALL RESPONSES TO PHQ9 QUESTIONS 1 & 2: 0
SUM OF ALL RESPONSES TO PHQ QUESTIONS 1-9: 0
2. FEELING DOWN, DEPRESSED OR HOPELESS: NOT AT ALL
SUM OF ALL RESPONSES TO PHQ QUESTIONS 1-9: 0

## 2024-07-03 ASSESSMENT — ENCOUNTER SYMPTOMS
CHEST TIGHTNESS: 0
COUGH: 0
DIARRHEA: 0
SORE THROAT: 0
NAUSEA: 0
VOMITING: 0
EYE DISCHARGE: 0
ABDOMINAL PAIN: 0
WHEEZING: 0
SHORTNESS OF BREATH: 0
CONSTIPATION: 0

## 2024-07-03 ASSESSMENT — SOCIAL DETERMINANTS OF HEALTH (SDOH): HOW HARD IS IT FOR YOU TO PAY FOR THE VERY BASICS LIKE FOOD, HOUSING, MEDICAL CARE, AND HEATING?: NOT HARD AT ALL

## 2024-07-03 NOTE — PROGRESS NOTES
[facility]  [facility] PRIMARY CARE  [address] [place] [address]  [address]  [facility] [address]  Dept: [phone]  Dept Fax: [phone]    Jarad Yo is a 53 y.o. male who is a Established patient, who presents today for his medical conditions/complaints as noted below:  Chief Complaint   Patient presents with    Discuss Labs         HPI:     He is here today to follow-up on sleep apnea and to discuss weight loss.  He states that he has been using the CPAP machine regularly and his sleep has improved.  He states that he is currently taking Farxiga and Bumex that was started by cardiologist Dr. Mills last year but he is not sure if he was supposed to continue taking them.  He is also currently on propranolol and per cardiology note he was supposed to start on metoprolol.  He states that he never received prescription for metoprolol.  His last echocardiogram shows left ventricular hypertrophy.  He is also wanting to try something for weight loss saying that he has been trying to exercise several times a week but has not noticed any weight loss.        Hemoglobin A1C (%)   Date Value   06/26/2024 5.2   06/06/2023 5.5   08/26/2022 5.5             ( goal A1Cis < 7)   No components found for: \"LABMICR\"  No components found for: \"LDLCHOLESTEROL\", \"LDLCALC\"    (goal LDL is <100)   AST (U/L)   Date Value   06/26/2024 30     ALT (U/L)   Date Value   06/26/2024 13     BUN (mg/dL)   Date Value   06/26/2024 15     BP Readings from Last 3 Encounters:   07/03/24 108/68   04/24/24 (!) 134/94   03/26/24 124/80          (goal 120/80)    Past Medical History:   Diagnosis Date    Alcoholism /alcohol abuse 08/21/2014    Allergic rhinitis, cause unspecified 08/07/2014    Asthma Unknown    Cirrhosis with alcoholism (HCC) 08/21/2014    End-stage liver disease (HCC) 08/21/2014    Esophageal reflux 08/07/2014    Essential hypertension, benign 08/07/2014    Obesity 1/1/1990    Long time but trying to lose    VIKKI on CPAP

## 2024-07-07 DIAGNOSIS — E55.9 VITAMIN D DEFICIENCY: ICD-10-CM

## 2024-07-08 RX ORDER — CHOLECALCIFEROL (VITAMIN D3) 125 MCG
1 CAPSULE ORAL DAILY
Qty: 30 TABLET | Refills: 5 | Status: SHIPPED | OUTPATIENT
Start: 2024-07-08

## 2024-07-08 NOTE — TELEPHONE ENCOUNTER
Jarad Yo is calling to request a refill on the following medication(s):    Medication Request:  Requested Prescriptions     Pending Prescriptions Disp Refills    Cholecalciferol (VITAMIN D3) 50 MCG (2000 UT) TABS [Pharmacy Med Name: VITAMIN D3 50 MCG TABLET] 30 tablet 5     Sig: take 1 tablet by mouth once daily       Last Visit Date (If Applicable):  7/3/2024    Next Visit Date:    Visit date not found

## 2024-07-11 ENCOUNTER — HOSPITAL ENCOUNTER (OUTPATIENT)
Dept: GENERAL RADIOLOGY | Age: 54
Discharge: HOME | End: 2024-07-13

## 2024-07-11 ENCOUNTER — HOSPITAL ENCOUNTER (OUTPATIENT)
Age: 54
Discharge: HOME | End: 2024-07-11

## 2024-07-11 DIAGNOSIS — R76.11 POSITIVE TB TEST: ICD-10-CM

## 2024-07-11 PROCEDURE — 71046 X-RAY EXAM CHEST 2 VIEWS: CPT

## 2024-07-16 ENCOUNTER — TELEPHONE (OUTPATIENT)
Dept: ORTHOPEDIC SURGERY | Age: 54
End: 2024-07-16

## 2024-07-16 RX ORDER — CYCLOBENZAPRINE HCL 5 MG
5 TABLET ORAL 3 TIMES DAILY PRN
Qty: 90 TABLET | Refills: 3 | Status: SHIPPED | OUTPATIENT
Start: 2024-07-16

## 2024-07-16 NOTE — TELEPHONE ENCOUNTER
Beeks it looks like this patient received Flexeril from his PCP.  He sent a message to us asking for a refill on that.  Your last appt was 6/25/24.  Do you want to give it or direct him to his PCP?

## 2024-07-16 NOTE — TELEPHONE ENCOUNTER
----- Message from Jarad Yo sent at 7/15/2024  8:12 PM EDT -----  Regarding: Back pain   Contact: [phone]  Can you please refill the muscle relaxer, cyclobenzaprine 5 mg?   The pain is beginning again, I don't want it reach the pain level I was at again.  My pharmacy now is [facility] on [address].  Pain began slightly last night but seems to be building today.  1 up from feeling it.      Thank you, Jarad Yo

## 2024-07-19 RX ORDER — PHENTERMINE HYDROCHLORIDE 37.5 MG/1
37.5 TABLET ORAL
Qty: 30 TABLET | Refills: 0 | Status: SHIPPED | OUTPATIENT
Start: 2024-07-19 | End: 2024-08-18

## 2024-07-19 RX ORDER — PHENTERMINE HYDROCHLORIDE 37.5 MG/1
37.5 TABLET ORAL
Qty: 30 TABLET | Refills: 0 | Status: SHIPPED | OUTPATIENT
Start: 2024-07-19 | End: 2024-07-19 | Stop reason: SDUPTHER

## 2024-08-06 ENCOUNTER — PATIENT MESSAGE (OUTPATIENT)
Dept: ORTHOPEDIC SURGERY | Age: 54
End: 2024-08-06

## 2024-08-06 NOTE — TELEPHONE ENCOUNTER
From: Jarad Yo  To: Dr. Tarik Trejo  Sent: 8/6/2024 2:57 PM EDT  Subject: Missed appointment     My apologies for missing my appointment. Work last minute prevented from an advance notice.     Update, pain is much improved. Some discomfort at times mainly after heavy lifting. Things are well I think. Unsure if he wants me to reschedule or provide any further follow up. Let me know.    Jarad Yo

## 2024-08-15 ENCOUNTER — PATIENT MESSAGE (OUTPATIENT)
Dept: FAMILY MEDICINE CLINIC | Age: 54
End: 2024-08-15

## 2024-08-15 DIAGNOSIS — M17.0 BILATERAL PRIMARY OSTEOARTHRITIS OF KNEE: ICD-10-CM

## 2024-08-16 RX ORDER — MELOXICAM 15 MG/1
15 TABLET ORAL DAILY
Qty: 30 TABLET | Refills: 1 | Status: SHIPPED | OUTPATIENT
Start: 2024-08-16

## 2024-08-20 DIAGNOSIS — M85.80 OSTEOPENIA, UNSPECIFIED LOCATION: Primary | ICD-10-CM

## 2024-08-20 DIAGNOSIS — M17.0 BILATERAL PRIMARY OSTEOARTHRITIS OF KNEE: ICD-10-CM

## 2024-08-20 RX ORDER — MELOXICAM 15 MG/1
15 TABLET ORAL DAILY
Qty: 30 TABLET | Refills: 1 | Status: SHIPPED | OUTPATIENT
Start: 2024-08-20

## 2024-08-26 DIAGNOSIS — E55.9 VITAMIN D DEFICIENCY: ICD-10-CM

## 2024-08-26 DIAGNOSIS — M85.80 OSTEOPENIA, UNSPECIFIED LOCATION: ICD-10-CM

## 2024-08-26 RX ORDER — CHOLECALCIFEROL (VITAMIN D3) 50 MCG
1 TABLET ORAL DAILY
Qty: 30 TABLET | Refills: 5 | Status: SHIPPED | OUTPATIENT
Start: 2024-08-26

## 2024-09-16 ENCOUNTER — TELEPHONE (OUTPATIENT)
Age: 54
End: 2024-09-16

## 2024-09-20 ENCOUNTER — TELEPHONE (OUTPATIENT)
Dept: FAMILY MEDICINE CLINIC | Age: 54
End: 2024-09-20

## 2024-09-20 DIAGNOSIS — U07.1 COVID-19: Primary | ICD-10-CM

## 2024-12-12 DIAGNOSIS — E55.9 VITAMIN D DEFICIENCY: ICD-10-CM

## 2024-12-12 DIAGNOSIS — J45.20 MILD INTERMITTENT ASTHMA WITHOUT COMPLICATION: ICD-10-CM

## 2024-12-12 DIAGNOSIS — M85.80 OSTEOPENIA, UNSPECIFIED LOCATION: ICD-10-CM

## 2024-12-12 DIAGNOSIS — M17.0 BILATERAL PRIMARY OSTEOARTHRITIS OF KNEE: ICD-10-CM

## 2024-12-13 RX ORDER — MELOXICAM 15 MG/1
15 TABLET ORAL DAILY
Qty: 90 TABLET | Refills: 1 | Status: SHIPPED | OUTPATIENT
Start: 2024-12-13

## 2024-12-13 RX ORDER — CHOLECALCIFEROL (VITAMIN D3) 50 MCG
1 TABLET ORAL DAILY
Qty: 90 TABLET | Refills: 1 | Status: SHIPPED | OUTPATIENT
Start: 2024-12-13

## 2024-12-13 RX ORDER — ALBUTEROL SULFATE 90 UG/1
INHALANT RESPIRATORY (INHALATION)
Qty: 2 EACH | Refills: 1 | Status: SHIPPED | OUTPATIENT
Start: 2024-12-13

## 2024-12-13 NOTE — TELEPHONE ENCOUNTER
Jarad Yo is calling to request a refill on the following medication(s):    Medication Request:  Requested Prescriptions     Pending Prescriptions Disp Refills    albuterol sulfate HFA (VENTOLIN HFA) 108 (90 Base) MCG/ACT inhaler 2 each 1     Sig: inhale 2 puffs by mouth every 6 hours if needed for wheezing    meloxicam (MOBIC) 15 MG tablet 30 tablet 1     Sig: Take 1 tablet by mouth daily    Cholecalciferol (VITAMIN D3) 50 MCG (2000 UT) TABS 30 tablet 5     Sig: Take 1 tablet by mouth daily    Calcium Carbonate-Vit D-Min (CALCIUM 600+D3 PLUS MINERALS) 600-800 MG-UNIT TABS 60 tablet 5     Sig: Take 1 tablet by mouth in the morning and at bedtime       Last Visit Date (If Applicable):  7/3/2024    Next Visit Date:    Visit date not found

## 2025-01-09 NOTE — TELEPHONE ENCOUNTER
Jarad Yo is calling to request a refill on the following medication(s):    Medication Request:  Requested Prescriptions     Pending Prescriptions Disp Refills    Semaglutide-Weight Management (WEGOVY) 0.25 MG/0.5ML SOAJ SC injection 3 mL 1     Sig: Inject 0.25 mg into the skin every 7 days       Last Visit Date (If Applicable):  7/3/2024    Next Visit Date:    Visit date not found

## 2025-02-03 ENCOUNTER — OFFICE VISIT (OUTPATIENT)
Dept: FAMILY MEDICINE CLINIC | Age: 55
End: 2025-02-03

## 2025-02-03 VITALS
HEIGHT: 72 IN | BODY MASS INDEX: 42.66 KG/M2 | OXYGEN SATURATION: 93 % | WEIGHT: 315 LBS | DIASTOLIC BLOOD PRESSURE: 80 MMHG | SYSTOLIC BLOOD PRESSURE: 132 MMHG | HEART RATE: 73 BPM | TEMPERATURE: 97 F

## 2025-02-03 DIAGNOSIS — H65.03 NON-RECURRENT ACUTE SEROUS OTITIS MEDIA OF BOTH EARS: ICD-10-CM

## 2025-02-03 DIAGNOSIS — G47.33 OSA ON CPAP: ICD-10-CM

## 2025-02-03 DIAGNOSIS — I85.00 ESOPHAGEAL VARICES WITHOUT BLEEDING, UNSPECIFIED ESOPHAGEAL VARICES TYPE (HCC): ICD-10-CM

## 2025-02-03 RX ORDER — PHENTERMINE HYDROCHLORIDE 37.5 MG/1
37.5 TABLET ORAL
Qty: 30 TABLET | Refills: 0 | Status: SHIPPED | OUTPATIENT
Start: 2025-02-03 | End: 2025-03-05

## 2025-02-03 SDOH — ECONOMIC STABILITY: FOOD INSECURITY: WITHIN THE PAST 12 MONTHS, YOU WORRIED THAT YOUR FOOD WOULD RUN OUT BEFORE YOU GOT MONEY TO BUY MORE.: NEVER TRUE

## 2025-02-03 SDOH — ECONOMIC STABILITY: FOOD INSECURITY: WITHIN THE PAST 12 MONTHS, THE FOOD YOU BOUGHT JUST DIDN'T LAST AND YOU DIDN'T HAVE MONEY TO GET MORE.: NEVER TRUE

## 2025-02-03 ASSESSMENT — PATIENT HEALTH QUESTIONNAIRE - PHQ9
SUM OF ALL RESPONSES TO PHQ QUESTIONS 1-9: 0
SUM OF ALL RESPONSES TO PHQ QUESTIONS 1-9: 0
SUM OF ALL RESPONSES TO PHQ9 QUESTIONS 1 & 2: 0
2. FEELING DOWN, DEPRESSED OR HOPELESS: NOT AT ALL
SUM OF ALL RESPONSES TO PHQ QUESTIONS 1-9: 0
1. LITTLE INTEREST OR PLEASURE IN DOING THINGS: NOT AT ALL
SUM OF ALL RESPONSES TO PHQ QUESTIONS 1-9: 0

## 2025-02-03 ASSESSMENT — ENCOUNTER SYMPTOMS
EYE DISCHARGE: 0
SHORTNESS OF BREATH: 0
ABDOMINAL PAIN: 0
SORE THROAT: 0
COUGH: 0
WHEEZING: 0
CHEST TIGHTNESS: 0
NAUSEA: 0
CONSTIPATION: 0
VOMITING: 0
DIARRHEA: 0

## 2025-02-03 NOTE — PROGRESS NOTES
[facility]  [facility] PRIMARY [address]  [address]  [facility] [address]  Dept: [phone]  Dept Fax: [phone]    Jarad Yo is a 54 y.o. male who is a Established patient, who presents today for his medical conditions/complaints as noted below:  Chief Complaint   Patient presents with    Weight Loss    Ear Pain     Has been having ear pain and headaches from the roof of his mouth to his ear         HPI:     HPI  History of Present Illness  He is here today to discuss weight loss.    He was seen by Cardiology last month. He was advised to discontinue Farxiga and use Bumex as needed for water retention. He has not been taking propranolol or metoprolol, as he believes these medications were contributing to low blood pressure and hindering his weight loss efforts. He reports no recent episodes of shortness of breath. He reports no recent leg swelling and typically sleeps through the night, although he occasionally wakes up due to discomfort from the CPAP machine.    He has been using a CPAP machine but suspects it may be causing an ear infection. He experiences intermittent, intense ear pain that radiates to his jaw, initially attributed to a dental issue. However, a subsequent dental examination revealed no oral health problems, suggesting a possible sinus or ear infection. He also reports occasional ear plugging and pressure, which can intensify and cause headaches before gradually subsiding. He cleans his CPAP machine weekly with distilled water and uses it at a setting of 8.    He has liver cirrhosis and has a procedure scheduled with Gastroenterology. He has not yet received Wegovy as the order had , so it was reordered, but he never received a call about it. In the interim, he refilled his Adipex prescription and started taking it approximately 2 weeks ago. He has lost about 8 pounds since starting Adipex.          Hemoglobin A1C (%)   Date Value   2024 5.2

## 2025-02-10 ENCOUNTER — TELEPHONE (OUTPATIENT)
Dept: FAMILY MEDICINE CLINIC | Age: 55
End: 2025-02-10

## 2025-02-13 NOTE — TELEPHONE ENCOUNTER
Made patient aware he verbally understood however the pain in his ear has subsided but he still has pressure in th ear the antibiodic ended yesterday please advise

## 2025-02-27 ENCOUNTER — TELEPHONE (OUTPATIENT)
Age: 55
End: 2025-02-27

## 2025-02-27 NOTE — TELEPHONE ENCOUNTER
Called patient to reschedule 3/18 appointment, but patient stated he would like to cancel the appointment with us as he is established with [facility].    All parties voiced understanding.

## 2025-03-21 ENCOUNTER — OFFICE VISIT (OUTPATIENT)
Dept: FAMILY MEDICINE CLINIC | Age: 55
End: 2025-03-21

## 2025-03-21 VITALS
BODY MASS INDEX: 42.66 KG/M2 | SYSTOLIC BLOOD PRESSURE: 120 MMHG | TEMPERATURE: 97 F | WEIGHT: 315 LBS | OXYGEN SATURATION: 97 % | HEIGHT: 72 IN | HEART RATE: 81 BPM | DIASTOLIC BLOOD PRESSURE: 70 MMHG

## 2025-03-21 DIAGNOSIS — G47.33 OSA ON CPAP: Primary | ICD-10-CM

## 2025-03-21 DIAGNOSIS — Z12.5 SCREENING FOR PROSTATE CANCER: ICD-10-CM

## 2025-03-21 DIAGNOSIS — Z13.1 DIABETES MELLITUS SCREENING: ICD-10-CM

## 2025-03-21 DIAGNOSIS — K76.6 PORTAL HYPERTENSION WITH ESOPHAGEAL VARICES (HCC): ICD-10-CM

## 2025-03-21 DIAGNOSIS — M17.11 PRIMARY OSTEOARTHRITIS OF RIGHT KNEE: ICD-10-CM

## 2025-03-21 DIAGNOSIS — I85.00 PORTAL HYPERTENSION WITH ESOPHAGEAL VARICES (HCC): ICD-10-CM

## 2025-03-21 DIAGNOSIS — E55.9 VITAMIN D DEFICIENCY: ICD-10-CM

## 2025-03-21 SDOH — ECONOMIC STABILITY: FOOD INSECURITY: WITHIN THE PAST 12 MONTHS, YOU WORRIED THAT YOUR FOOD WOULD RUN OUT BEFORE YOU GOT MONEY TO BUY MORE.: NEVER TRUE

## 2025-03-21 SDOH — ECONOMIC STABILITY: FOOD INSECURITY: WITHIN THE PAST 12 MONTHS, THE FOOD YOU BOUGHT JUST DIDN'T LAST AND YOU DIDN'T HAVE MONEY TO GET MORE.: NEVER TRUE

## 2025-03-21 ASSESSMENT — ENCOUNTER SYMPTOMS
VOMITING: 0
CONSTIPATION: 0
EYE DISCHARGE: 0
CHEST TIGHTNESS: 0
DIARRHEA: 0
NAUSEA: 0
COUGH: 0
ABDOMINAL PAIN: 0
SORE THROAT: 0
WHEEZING: 0
SHORTNESS OF BREATH: 0

## 2025-03-21 ASSESSMENT — PATIENT HEALTH QUESTIONNAIRE - PHQ9
SUM OF ALL RESPONSES TO PHQ QUESTIONS 1-9: 0
1. LITTLE INTEREST OR PLEASURE IN DOING THINGS: NOT AT ALL
SUM OF ALL RESPONSES TO PHQ QUESTIONS 1-9: 0
2. FEELING DOWN, DEPRESSED OR HOPELESS: NOT AT ALL

## 2025-03-21 ASSESSMENT — VISUAL ACUITY
OD_CC: 20/20
OS_CC: 20/20

## 2025-03-21 ASSESSMENT — LIFESTYLE VARIABLES
HOW OFTEN DO YOU HAVE A DRINK CONTAINING ALCOHOL: NEVER
HOW MANY STANDARD DRINKS CONTAINING ALCOHOL DO YOU HAVE ON A TYPICAL DAY: PATIENT DOES NOT DRINK

## 2025-03-21 NOTE — PROGRESS NOTES
[facility]  [facility] PRIMARY CARE  [address] [place] [address]  [address]  [facility] [address]  Dept: [phone]  Dept Fax: [phone]    Jarad Yo is a 54 y.o. male who is a Established patient, who presents today for his medical conditions/complaints as noted below:  Chief Complaint   Patient presents with    Weight Loss         HPI:     HPI  History of Present Illness  The patient presents for weight management, knee pain, sleep apnea, liver cirrhosis, and constipation.    He has been on Adipex for the past 2 months but reports no significant weight loss. He is not experiencing any adverse effects from the medication. Despite engaging in physical exercise 4 times a week and reducing his food intake, he has not observed any weight loss. His diet typically consists of oatmeal and 2 boiled eggs for breakfast, skipping lunch, and consuming a small portion of starch, protein, and vegetables for dinner. He occasionally indulges in a piece of dessert in the evening. He does not consume fruits due to a burning sensation on his tongue. He has an upcoming appointment with a weight loss clinic in 04/2025 or 05/2025. He reports feeling bloated and has been advised by his gastroenterologist to undergo an ultrasound twice a year. He has abstained from alcohol and smoking. He experiences shortness of breath when climbing stairs but not during workouts. He also reports a lack of perspiration during intense workouts. He was previously on propranolol, which he discontinued last summer, and has since maintained his weight.    He requires knee replacement surgery but has not been able to schedule it due to inconsistent employment. He has received steroid injections and used gel for his knees, but these have not provided relief. He takes meloxicam daily for knee pain, which provides some relief. He occasionally takes ibuprofen for back pain and muscle spasms. He had left knee surgery in 2019 or 2020 and needs right knee

## 2025-03-24 DIAGNOSIS — J45.20 MILD INTERMITTENT ASTHMA WITHOUT COMPLICATION: ICD-10-CM

## 2025-03-24 NOTE — TELEPHONE ENCOUNTER
Jarad Yo is calling to request a refill on the following medication(s):    Medication Request:  Requested Prescriptions     Pending Prescriptions Disp Refills    albuterol sulfate HFA (PROVENTIL;VENTOLIN;PROAIR) 108 (90 Base) MCG/ACT inhaler [Pharmacy Med Name: ALBUTEROL HFA INH (200 PUFFS) 8.5GM] 17 g      Sig: INHALE 2 PUFFS BY MOUTH EVERY 6 HOURS AS NEEDED FOR WHEEZING       Last Visit Date (If Applicable):  3/21/2025    Next Visit Date:    6/23/2025

## 2025-03-25 RX ORDER — ALBUTEROL SULFATE 90 UG/1
2 INHALANT RESPIRATORY (INHALATION) EVERY 6 HOURS PRN
Qty: 17 G | Refills: 0 | Status: SHIPPED | OUTPATIENT
Start: 2025-03-25

## 2025-03-28 DIAGNOSIS — K76.6 PORTAL HYPERTENSION WITH ESOPHAGEAL VARICES (HCC): ICD-10-CM

## 2025-03-28 DIAGNOSIS — I85.00 PORTAL HYPERTENSION WITH ESOPHAGEAL VARICES (HCC): ICD-10-CM

## 2025-03-31 ENCOUNTER — RESULTS FOLLOW-UP (OUTPATIENT)
Dept: FAMILY MEDICINE CLINIC | Age: 55
End: 2025-03-31

## 2025-04-04 DIAGNOSIS — M25.561 RIGHT KNEE PAIN, UNSPECIFIED CHRONICITY: Primary | ICD-10-CM

## 2025-04-08 ENCOUNTER — HOSPITAL ENCOUNTER (OUTPATIENT)
Age: 55
Discharge: HOME | End: 2025-04-10
Payer: MEDICAID

## 2025-04-08 ENCOUNTER — HOSPITAL ENCOUNTER (OUTPATIENT)
Age: 55
Setting detail: SPECIMEN
Discharge: HOME | End: 2025-04-08

## 2025-04-08 ENCOUNTER — RESULTS FOLLOW-UP (OUTPATIENT)
Dept: FAMILY MEDICINE CLINIC | Age: 55
End: 2025-04-08

## 2025-04-08 ENCOUNTER — HOSPITAL ENCOUNTER (OUTPATIENT)
Dept: GENERAL RADIOLOGY | Age: 55
Discharge: HOME | End: 2025-04-10
Payer: MEDICAID

## 2025-04-08 ENCOUNTER — OFFICE VISIT (OUTPATIENT)
Age: 55
End: 2025-04-08
Payer: MEDICAID

## 2025-04-08 VITALS — HEIGHT: 72 IN | WEIGHT: 315 LBS | BODY MASS INDEX: 42.66 KG/M2

## 2025-04-08 DIAGNOSIS — K76.6 PORTAL HYPERTENSION WITH ESOPHAGEAL VARICES (HCC): ICD-10-CM

## 2025-04-08 DIAGNOSIS — I85.00 PORTAL HYPERTENSION WITH ESOPHAGEAL VARICES (HCC): ICD-10-CM

## 2025-04-08 DIAGNOSIS — E55.9 VITAMIN D DEFICIENCY: ICD-10-CM

## 2025-04-08 DIAGNOSIS — Z13.1 DIABETES MELLITUS SCREENING: ICD-10-CM

## 2025-04-08 DIAGNOSIS — Z12.5 SCREENING FOR PROSTATE CANCER: ICD-10-CM

## 2025-04-08 DIAGNOSIS — M17.11 ARTHRITIS OF RIGHT KNEE: Primary | ICD-10-CM

## 2025-04-08 LAB
25(OH)D3 SERPL-MCNC: 40.2 NG/ML (ref 30–100)
ALBUMIN SERPL-MCNC: 3.7 G/DL (ref 3.5–5.2)
ALBUMIN/GLOB SERPL: 0.9 {RATIO} (ref 1–2.5)
ALP SERPL-CCNC: 77 U/L (ref 40–129)
ALT SERPL-CCNC: 20 U/L (ref 10–50)
ANION GAP SERPL CALCULATED.3IONS-SCNC: 9 MMOL/L (ref 9–16)
AST SERPL-CCNC: 35 U/L (ref 10–50)
BASOPHILS # BLD: 0.1 K/UL (ref 0–0.2)
BASOPHILS NFR BLD: 2 % (ref 0–2)
BILIRUB SERPL-MCNC: 0.9 MG/DL (ref 0–1.2)
BUN SERPL-MCNC: 11 MG/DL (ref 6–20)
CALCIUM SERPL-MCNC: 8.7 MG/DL (ref 8.6–10.4)
CHLORIDE SERPL-SCNC: 106 MMOL/L (ref 98–107)
CHOLEST SERPL-MCNC: 128 MG/DL (ref 0–199)
CHOLESTEROL/HDL RATIO: 2.4
CO2 SERPL-SCNC: 25 MMOL/L (ref 20–31)
CREAT SERPL-MCNC: 0.7 MG/DL (ref 0.7–1.2)
EOSINOPHIL # BLD: 0.15 K/UL (ref 0–0.44)
EOSINOPHILS RELATIVE PERCENT: 3 % (ref 1–4)
ERYTHROCYTE [DISTWIDTH] IN BLOOD BY AUTOMATED COUNT: 14.6 % (ref 11.8–14.4)
EST. AVERAGE GLUCOSE BLD GHB EST-MCNC: 105 MG/DL
GFR, ESTIMATED: >90 ML/MIN/1.73M2
GLUCOSE SERPL-MCNC: 86 MG/DL (ref 74–99)
HBA1C MFR BLD: 5.3 % (ref 4–6)
HCT VFR BLD AUTO: 46.3 % (ref 40.7–50.3)
HDLC SERPL-MCNC: 54 MG/DL
HGB BLD-MCNC: 15.1 G/DL (ref 13–17)
IMM GRANULOCYTES # BLD AUTO: <0.03 K/UL (ref 0–0.3)
IMM GRANULOCYTES NFR BLD: 0 %
LDLC SERPL CALC-MCNC: 61 MG/DL (ref 0–100)
LYMPHOCYTES NFR BLD: 1.85 K/UL (ref 1.1–3.7)
LYMPHOCYTES RELATIVE PERCENT: 37 % (ref 24–43)
MCH RBC QN AUTO: 32.3 PG (ref 25.2–33.5)
MCHC RBC AUTO-ENTMCNC: 32.6 G/DL (ref 28.4–34.8)
MCV RBC AUTO: 98.9 FL (ref 82.6–102.9)
MONOCYTES NFR BLD: 0.5 K/UL (ref 0.1–1.2)
MONOCYTES NFR BLD: 10 % (ref 3–12)
NEUTROPHILS NFR BLD: 48 % (ref 36–65)
NEUTS SEG NFR BLD: 2.39 K/UL (ref 1.5–8.1)
NRBC BLD-RTO: 0 PER 100 WBC
PLATELET # BLD AUTO: ABNORMAL K/UL (ref 138–453)
PLATELET, FLUORESCENCE: 137 K/UL (ref 138–453)
PLATELETS.RETICULATED NFR BLD AUTO: 3.9 % (ref 1.1–10.3)
POTASSIUM SERPL-SCNC: 4 MMOL/L (ref 3.7–5.3)
PROT SERPL-MCNC: 7.6 G/DL (ref 6.6–8.7)
PSA SERPL-MCNC: 0.41 NG/ML (ref 0–4)
RBC # BLD AUTO: 4.68 M/UL (ref 4.21–5.77)
RBC # BLD: ABNORMAL 10*6/UL
SODIUM SERPL-SCNC: 140 MMOL/L (ref 136–145)
TRIGL SERPL-MCNC: 66 MG/DL
TSH SERPL DL<=0.05 MIU/L-ACNC: 1.31 UIU/ML (ref 0.27–4.2)
VLDLC SERPL CALC-MCNC: 13 MG/DL (ref 1–30)
WBC OTHER # BLD: 5 K/UL (ref 3.5–11.3)

## 2025-04-08 PROCEDURE — 73564 X-RAY EXAM KNEE 4 OR MORE: CPT

## 2025-04-08 PROCEDURE — 99204 OFFICE O/P NEW MOD 45 MIN: CPT | Performed by: ORTHOPAEDIC SURGERY

## 2025-04-08 NOTE — PROGRESS NOTES
[facility]      [facility] PHYSICIANS [facility] ORTHOPAEDICS AND SPORTS MEDICINE  [facility]  [place] [address]  Dept: [phone]  Dept Fax: [phone]    Chief Compliant:  Chief Complaint   Patient presents with    Knee Pain     Right Knee Pain        History of Present Illness:  This is a 54 y.o. male who presents to the clinic today for evaluation / follow up of right knee arthritis.  He has been valgus arthritis for several years.  He does take meloxicam.  He has swelling and stiffness in the knee.  He rates the pain 9 out of 10.  He does feel unstable at times and feels a fall risk given the mild discomfort and pain.  He tried steroid injections the past and a low impact exercise program.  He tried Tylenol and ibuprofen..       Physical Exam:    On examination today he has crepitus through range of motion he does lack a few degrees of extension flexion back to approximate 115 degrees no varus or valgus instability.  No effusion.  There is tenderness along the joint line.  He does walk with antalgic gait.  Skin is normal over top.    Nursing note and vitals reviewed.     Labs and Imaging: X-rays of the right knee show bone-on-bone arthritis with complete loss of joint space osteophytes present subchondral sclerosis     XR taken today:  No results found.      No orders of the defined types were placed in this encounter.      Assessment and Plan:  No diagnosis found.      This is a 54 y.o. male with right knee arthritis..  I discussed with him I would recommend total knee replacement given the severity of his symptoms and lack of improvement with conservative measures.  Risks of surgery including blood clots excessive knee stiffness and infection were discussed.  I would recommend right total knee replacement.  He would like to go forward with this.  Will see her back for this procedure.        Review of Systems   Constitutional:

## 2025-04-09 ENCOUNTER — PREP FOR PROCEDURE (OUTPATIENT)
Age: 55
End: 2025-04-09

## 2025-04-09 PROBLEM — M17.11 OSTEOARTHRITIS OF RIGHT KNEE: Status: ACTIVE | Noted: 2025-04-09

## 2025-05-09 ENCOUNTER — TELEPHONE (OUTPATIENT)
Age: 55
End: 2025-05-09

## 2025-05-09 DIAGNOSIS — Z01.818 PRE-OP EXAM: ICD-10-CM

## 2025-05-09 DIAGNOSIS — M17.11 ARTHRITIS OF RIGHT KNEE: Primary | ICD-10-CM

## 2025-05-09 NOTE — TELEPHONE ENCOUNTER
Patient has been scheduled for sx on 6/2. Instructions were given at the time of booking.  I called today and left a message confirming date/time/location of sx and PAT and class.  I also scheduled the post op appointment.  He/she may call back with questions or to change the appointment.  His left knee was replaced in 2020 and he has a walker.

## 2025-05-23 ENCOUNTER — OFFICE VISIT (OUTPATIENT)
Age: 55
End: 2025-05-23
Payer: MEDICAID

## 2025-05-23 VITALS — BODY MASS INDEX: 42.66 KG/M2 | HEIGHT: 72 IN | WEIGHT: 315 LBS

## 2025-05-23 DIAGNOSIS — M17.11 PRIMARY OSTEOARTHRITIS OF RIGHT KNEE: Primary | ICD-10-CM

## 2025-05-23 PROCEDURE — 99214 OFFICE O/P EST MOD 30 MIN: CPT

## 2025-05-23 RX ORDER — MELOXICAM 15 MG/1
15 TABLET ORAL DAILY
Qty: 30 TABLET | Refills: 2 | Status: SHIPPED | OUTPATIENT
Start: 2025-05-23

## 2025-05-23 NOTE — PROGRESS NOTES
[facility] Orthopaedics & Sports Medicine      [facility]THOPAEDICS AND SPORTS MEDICINE  [address]  [place] [address]  Dept: [phone]  Dept Fax: [phone]    Chief Compliant:  Chief Complaint   Patient presents with    Knee Pain     R Knee         History of Present Illness:  This is a pleasant 54 y.o. male who presents to the clinic today for evaluation / follow up of right knee osteoarthritis.  Patient was recently scheduled for total knee arthroplasty however he did get a new job and he is not able to take time off now.  He would like to put off his total knee for several months.  He would like to try an intra-articular injection today.  He also has been prescribed meloxicam in the past for pain.  He is unable to take Tylenol secondary to liver disease.  He presents today for further evaluation and treatment.      Physical Exam:    Right knee: He is probably about a couple degrees shy full extension on exam today.  He is able to flex back to probably about 100 degrees or so.  He has tenderness to palpation along the joint lines.  He has crepitus start range of motion.  He stable varus valgus stress at 0 and 30 degrees.  No significant palpable effusion on exam today.    Nursing note and vitals reviewed.     Labs and Imaging:     XR taken today:  No results found.      No orders of the defined types were placed in this encounter.      Assessment and Plan:  1. Primary osteoarthritis of right knee          This is a pleasant 54 y.o. male with right knee osteoarthritis.  He would like to try a steroid injection today since he is planning for surgery for several months.  Informed him that he has to wait at least 3 months prior to having the surgery with having this injection done today.  He understood this.  Consent was obtained. Risks are pain, infection, joint stiffness, and increased blood glucose. The area was prepped

## 2025-06-09 DIAGNOSIS — M17.11 PRIMARY OSTEOARTHRITIS OF RIGHT KNEE: Primary | ICD-10-CM

## 2025-06-17 DIAGNOSIS — J45.20 MILD INTERMITTENT ASTHMA WITHOUT COMPLICATION: ICD-10-CM

## 2025-06-17 NOTE — TELEPHONE ENCOUNTER
Jarad Yo is calling to request a refill on the following medication(s):    Medication Request:  Requested Prescriptions     Pending Prescriptions Disp Refills    albuterol (PROVENTIL) (2.5 MG/3ML) 0.083% nebulizer solution       Sig: Take 3 mLs by nebulization every 4 hours as needed for Wheezing or Shortness of Breath       Last Visit Date (If Applicable):  3/21/2025    Next Visit Date:    6/23/2025

## 2025-06-18 RX ORDER — ALBUTEROL SULFATE 0.83 MG/ML
2.5 SOLUTION RESPIRATORY (INHALATION) EVERY 4 HOURS PRN
Qty: 3 ML | Refills: 1 | Status: SHIPPED | OUTPATIENT
Start: 2025-06-18 | End: 2025-07-18

## 2025-07-07 ENCOUNTER — TELEPHONE (OUTPATIENT)
Dept: FAMILY MEDICINE CLINIC | Age: 55
End: 2025-07-07

## 2025-07-07 ENCOUNTER — E-VISIT (OUTPATIENT)
Dept: FAMILY MEDICINE CLINIC | Age: 55
End: 2025-07-07
Payer: MEDICAID

## 2025-07-07 DIAGNOSIS — L73.9 FOLLICULITIS: Primary | ICD-10-CM

## 2025-07-07 DIAGNOSIS — L98.9 BUMPS ON SKIN: Primary | ICD-10-CM

## 2025-07-07 PROCEDURE — 99422 OL DIG E/M SVC 11-20 MIN: CPT | Performed by: STUDENT IN AN ORGANIZED HEALTH CARE EDUCATION/TRAINING PROGRAM

## 2025-07-07 RX ORDER — SULFAMETHOXAZOLE AND TRIMETHOPRIM 800; 160 MG/1; MG/1
1 TABLET ORAL 2 TIMES DAILY
Qty: 14 TABLET | Refills: 0 | Status: SHIPPED | OUTPATIENT
Start: 2025-07-07 | End: 2025-07-14

## 2025-07-07 NOTE — PROGRESS NOTES
Thank you for submitting an Evisit.  I think that your rash is most likely due to folliculitis.      I have sent the following prescription(s) to your pharmacy: Bactrim 7-day course.     Please contact my office if your symptoms do not improve within 10 days.  Please seek more urgent medical attention if your rash worsens, or you develop any of the following:  New joint pain  Signs of infection, such as increased pain, swelling, warmth, redness, red streaks leading from the area, pus draining from the area or unexplained fevers    Sincerely,  Ginna De Oliveira MD    On this date, 7/7/2025 I have spent 15 minutes reviewing previous notes, test results, as well as documenting on the day of the visit.

## 2025-07-31 ENCOUNTER — OFFICE VISIT (OUTPATIENT)
Dept: FAMILY MEDICINE CLINIC | Age: 55
End: 2025-07-31
Payer: MEDICAID

## 2025-07-31 VITALS
BODY MASS INDEX: 42.66 KG/M2 | HEART RATE: 75 BPM | TEMPERATURE: 97 F | HEIGHT: 72 IN | OXYGEN SATURATION: 97 % | WEIGHT: 315 LBS | SYSTOLIC BLOOD PRESSURE: 116 MMHG | DIASTOLIC BLOOD PRESSURE: 70 MMHG

## 2025-07-31 DIAGNOSIS — J45.20 MILD INTERMITTENT ASTHMA WITHOUT COMPLICATION: ICD-10-CM

## 2025-07-31 DIAGNOSIS — D68.4 ACQUIRED CLOTTING FACTOR DEFICIENCY: ICD-10-CM

## 2025-07-31 DIAGNOSIS — G47.33 OSA ON CPAP: ICD-10-CM

## 2025-07-31 DIAGNOSIS — L73.9 FOLLICULITIS: Primary | ICD-10-CM

## 2025-07-31 PROCEDURE — 99214 OFFICE O/P EST MOD 30 MIN: CPT | Performed by: STUDENT IN AN ORGANIZED HEALTH CARE EDUCATION/TRAINING PROGRAM

## 2025-07-31 RX ORDER — SULFAMETHOXAZOLE AND TRIMETHOPRIM 800; 160 MG/1; MG/1
1 TABLET ORAL 2 TIMES DAILY
Qty: 20 TABLET | Refills: 0 | Status: SHIPPED | OUTPATIENT
Start: 2025-07-31 | End: 2025-08-10

## 2025-07-31 SDOH — ECONOMIC STABILITY: FOOD INSECURITY: WITHIN THE PAST 12 MONTHS, YOU WORRIED THAT YOUR FOOD WOULD RUN OUT BEFORE YOU GOT MONEY TO BUY MORE.: NEVER TRUE

## 2025-07-31 SDOH — ECONOMIC STABILITY: FOOD INSECURITY: WITHIN THE PAST 12 MONTHS, THE FOOD YOU BOUGHT JUST DIDN'T LAST AND YOU DIDN'T HAVE MONEY TO GET MORE.: NEVER TRUE

## 2025-07-31 ASSESSMENT — PATIENT HEALTH QUESTIONNAIRE - PHQ9
1. LITTLE INTEREST OR PLEASURE IN DOING THINGS: NOT AT ALL
SUM OF ALL RESPONSES TO PHQ QUESTIONS 1-9: 0
SUM OF ALL RESPONSES TO PHQ QUESTIONS 1-9: 0
2. FEELING DOWN, DEPRESSED OR HOPELESS: NOT AT ALL
SUM OF ALL RESPONSES TO PHQ QUESTIONS 1-9: 0
SUM OF ALL RESPONSES TO PHQ QUESTIONS 1-9: 0

## 2025-07-31 NOTE — PROGRESS NOTES
[facility]  [facility] CARE  [address] [place] [address]  [address]  [facility] [place] [address]  Dept: [phone]  Dept Fax: [phone]    Jarad Yo is a 54 y.o. male who is a Established patient, who presents today for his medical conditions/complaints as noted below:  Chief Complaint   Patient presents with    Weight Loss         HPI:     HPI  History of Present Illness  The patient presents for evaluation of recurrent blisters on his ear and elevated insulin levels.    He reports persistent blisters on his ear, which initially responded to antibiotic treatment but have since recurred. He also notes the presence of similar blisters in the crevices of his legs. Despite using a topical antibiotic, the blisters remain itchy and occasionally painful. The blisters have been present for approximately 3 to 4 weeks. He was on an oral antibiotic for 7 days, which he completed. He has been using a topical antibiotic.    Recent blood work revealed borderline insulin levels, a finding that is new to him. All other lab results were within normal limits. He was prescribed metformin but is uncertain about starting the medication as he has never been diagnosed with diabetes before. He keeps Jaylon-Aid by his bed to drink if he wakes up thirsty, although he does not recall doing so recently. He occasionally experiences a dry throat due to mask use. He has not tried Zepbound or Mounjaro as they were not covered by his insurance. He attempted to use Adipex but found it ineffective. He consulted a weight loss clinic doctor who ordered the insulin test. His diet includes low-calorie Jaylon-Aid, tea, and morning coffee with sweetener. He also consumes 5 to 6 candies throughout the day.    Social History:  Diet: Low-calorie Jaylon-Aid, tea, morning coffee with sweetener  Coffee/Tea/Caffeine-containing Drinks: Tea, morning coffee with sweetener        Hemoglobin A1C (%)   Date Value   04/08/2025 5.3   06/26/2024 5.2   06/06/2023 5.5

## 2025-08-01 ASSESSMENT — ENCOUNTER SYMPTOMS
DIARRHEA: 0
VOMITING: 0
COUGH: 0
WHEEZING: 0
SHORTNESS OF BREATH: 0
ABDOMINAL PAIN: 0
EYE DISCHARGE: 0
NAUSEA: 0
SORE THROAT: 0
CONSTIPATION: 0
CHEST TIGHTNESS: 0

## 2025-08-04 ENCOUNTER — TELEPHONE (OUTPATIENT)
Dept: FAMILY MEDICINE CLINIC | Age: 55
End: 2025-08-04

## 2025-08-11 DIAGNOSIS — B00.9 HERPES: Primary | ICD-10-CM

## 2025-08-11 RX ORDER — VALACYCLOVIR HYDROCHLORIDE 500 MG/1
500 TABLET, FILM COATED ORAL DAILY
Qty: 90 TABLET | Refills: 1 | Status: SHIPPED | OUTPATIENT
Start: 2025-08-11

## 2025-08-18 DIAGNOSIS — M17.11 PRIMARY OSTEOARTHRITIS OF RIGHT KNEE: ICD-10-CM

## 2025-08-25 DIAGNOSIS — G47.33 OSA ON CPAP: ICD-10-CM

## 2025-08-27 ENCOUNTER — TELEPHONE (OUTPATIENT)
Dept: FAMILY MEDICINE CLINIC | Age: 55
End: 2025-08-27

## 2025-08-30 DIAGNOSIS — M17.11 PRIMARY OSTEOARTHRITIS OF RIGHT KNEE: Primary | ICD-10-CM

## 2025-09-02 RX ORDER — MELOXICAM 15 MG/1
15 TABLET ORAL DAILY
Qty: 30 TABLET | Refills: 2 | Status: SHIPPED | OUTPATIENT
Start: 2025-09-02